# Patient Record
Sex: FEMALE | Race: WHITE | NOT HISPANIC OR LATINO | Employment: UNEMPLOYED | URBAN - METROPOLITAN AREA
[De-identification: names, ages, dates, MRNs, and addresses within clinical notes are randomized per-mention and may not be internally consistent; named-entity substitution may affect disease eponyms.]

---

## 2017-01-06 ENCOUNTER — ROUTINE PRENATAL (OUTPATIENT)
Dept: OBSTETRICS AND GYNECOLOGY | Facility: CLINIC | Age: 32
End: 2017-01-06
Payer: MEDICAID

## 2017-01-06 ENCOUNTER — PROCEDURE VISIT (OUTPATIENT)
Dept: OBSTETRICS AND GYNECOLOGY | Facility: CLINIC | Age: 32
End: 2017-01-06
Payer: MEDICAID

## 2017-01-06 VITALS
BODY MASS INDEX: 38.31 KG/M2 | SYSTOLIC BLOOD PRESSURE: 124 MMHG | WEIGHT: 209.44 LBS | DIASTOLIC BLOOD PRESSURE: 80 MMHG

## 2017-01-06 DIAGNOSIS — O99.213 OBESITY COMPLICATING PREGNANCY, THIRD TRIMESTER: ICD-10-CM

## 2017-01-06 DIAGNOSIS — O34.219 PREVIOUS CESAREAN SECTION COMPLICATING PREGNANCY: Primary | ICD-10-CM

## 2017-01-06 DIAGNOSIS — Z3A.32 32 WEEKS GESTATION OF PREGNANCY: ICD-10-CM

## 2017-01-06 PROCEDURE — 99213 OFFICE O/P EST LOW 20 MIN: CPT | Mod: TH,S$PBB,, | Performed by: OBSTETRICS & GYNECOLOGY

## 2017-01-06 PROCEDURE — 76819 FETAL BIOPHYS PROFIL W/O NST: CPT | Mod: PBBFAC | Performed by: OBSTETRICS & GYNECOLOGY

## 2017-01-06 PROCEDURE — 90471 IMMUNIZATION ADMIN: CPT | Mod: PBBFAC | Performed by: OBSTETRICS & GYNECOLOGY

## 2017-01-06 PROCEDURE — 90715 TDAP VACCINE 7 YRS/> IM: CPT | Mod: PBBFAC | Performed by: OBSTETRICS & GYNECOLOGY

## 2017-01-06 PROCEDURE — 76819 FETAL BIOPHYS PROFIL W/O NST: CPT | Mod: 26,S$PBB,, | Performed by: OBSTETRICS & GYNECOLOGY

## 2017-01-06 PROCEDURE — 99212 OFFICE O/P EST SF 10 MIN: CPT | Mod: PBBFAC,25 | Performed by: OBSTETRICS & GYNECOLOGY

## 2017-01-06 PROCEDURE — 99999 PR PBB SHADOW E&M-EST. PATIENT-LVL II: CPT | Mod: PBBFAC,,, | Performed by: OBSTETRICS & GYNECOLOGY

## 2017-01-06 NOTE — MR AVS SNAPSHOT
[facility]  [address] [facility] [facility]  [place] [address]  Phone: [phone]  Fax: [phone]                  Mago Mcallister   2017 2:45 PM   Routine Prenatal    Description:  Female : 1985   Provider:  Mago Alaniz CNM   Department:  [facility]           Reason for Visit     Routine Prenatal Visit           Diagnoses this Visit        Comments    Previous  section complicating pregnancy    -  Primary            To Do List           Future Appointments        Provider Department Dept Phone    2017 2:30 PM LABORATORY, [address] [facility] [phone]    2017 2:30 PM Shelli Black MD [facility] OB/ -[phone]      Goals (5 Years of Data)     None      [facility]     [facility] On Call Nurse Care Line -  Assistance  Registered nurses in the [facility] provide clinical advisement, health education, appointment booking, and other advisory services.  Call for this free service at [phone].             Medications           Message regarding Medications     Verify the changes and/or additions to your medication regime listed below are the same as discussed with your clinician today.  If any of these changes or additions are incorrect, please notify your healthcare provider.             Verify that the below list of medications is an accurate representation of the medications you are currently taking.  If none reported, the list may be blank. If incorrect, please contact your healthcare provider. Carry this list with you in case of emergency.           Current Medications     aspirin (ECOTRIN) 81 MG EC tablet Take 1 tablet (81 mg total) by mouth once daily.    ondansetron (ZOFRAN, AS HYDROCHLORIDE,) 4 MG tablet Take 1 tablet (4 mg total) by mouth daily as needed for Nausea.    prenatal multivit-Ca-min-Fe-FA (PRENATAL VITAMIN) Tab Take 1 tablet by mouth once daily.    promethazine (PHENERGAN) 25 MG  tablet Take 1 tablet (25 mg total) by mouth every 6 (six) hours as needed for Nausea.           Clinical Reference Information           Prenatal Vitals     Enc. Date GA Prenatal Vitals Prenatal Pulse Pain Level Urine Albumin/Glucose Edema Presentation Dilation/Effacement/Station    1/6/17 32w3d 124/80 / 95 kg (209 lb 7 oz)  / us 144 / Present          10/20/16 21w2d 118/72 / 95 kg (209 lb 7 oz)  / us 144 / Present  0  None / None / None / No      9/29/16 18w2d 116/64 / 97.6 kg (215 lb 2.7 oz)           9/23/16 17w3d 122/60 / 97 kg (213 lb 13.5 oz)  / us / Absent   Negative / Negative None / None / None / No         TWG: -7.059 kg (-15 lb 9 oz)   Pregravid weight: 102.1 kg (225 lb)   Number of fetuses: 1       Vital Signs - Last Recorded  Most recent update: 1/6/2017  3:08 PM by Ros Monsalve MA    BP Wt LMP BMI       124/80 95 kg (209 lb 7 oz) 05/24/2016 38.31 kg/m2       Allergies as of 1/6/2017     No Known Allergies      Immunizations Administered on Date of Encounter - 1/6/2017     Name Date Dose VIS Date Route    TDAP  Incomplete 0.5 mL 2/24/2015 Intramuscular      Orders Placed During Today's Visit      Normal Orders This Visit    Tdap Vaccine (Adult)     Future Labs/Procedures Expected by Expires    CBC auto differential  1/6/2017 3/7/2018    HIV-1 and HIV-2 antibodies  1/6/2017 3/7/2018    OB Glucose Screen  1/6/2017 3/7/2018    RPR  1/6/2017 3/7/2018      [facility] Sign-Up     Activating your [facility] account is as easy as 1-2-3!     1) Visit [email or web address], select Sign Up Now, enter this activation code and your date of birth, then select Next.  [email or web address]  Expires: 2/20/2017  3:11 PM      2) Create a username and password to use when you visit [facility] in the future and select a security question in case you lose your password and select Next.    3) Enter your e-mail address and click Sign Up!    Additional Information  If you have questions, please e-mail [email or web address] or call  [phone] to talk to our [facility] staff. Remember, [facility] is NOT to be used for urgent needs. For medical emergencies, dial 911.

## 2017-01-06 NOTE — PROGRESS NOTES
Gap in care since 21 weeks. Was unable to get appt (?)  BPP  vtx; growth approprate; still with sub-opt spine and ROT; will repeat US in 4 weeks  Needs T3 labs ASAP  Will schedule with MD next visit to discuss TOLA2C vs repeat  with BTL (patient would like to see Dr. Black) and schedule c/s if necessary  OP reports requested from [facility] (both c/s for severe elevated BPs per patient)  BTL consent signed today  TDAP today  Taking ASA  Overall -15lb still with daily nausea but able to hydrate and keep down food, Zofran helps; rec made  C/o round ligament pain, reassured, rec made  PTL precautions and FKCs  Reports dad has Factor V and would like to be tested for it

## 2017-01-11 ENCOUNTER — TELEPHONE (OUTPATIENT)
Dept: OBSTETRICS AND GYNECOLOGY | Facility: CLINIC | Age: 32
End: 2017-01-11

## 2017-01-11 NOTE — TELEPHONE ENCOUNTER
----- Message from Mago Alaniz CNM sent at 1/6/2017  3:28 PM CST -----  Regarding: OP reports  Check on OP reports for this patient

## 2017-01-17 RX ORDER — ONDANSETRON 4 MG/1
TABLET, FILM COATED ORAL
Qty: 30 TABLET | Refills: 0 | Status: ON HOLD | OUTPATIENT
Start: 2017-01-17 | End: 2017-02-18 | Stop reason: HOSPADM

## 2017-01-18 ENCOUNTER — TELEPHONE (OUTPATIENT)
Dept: OBSTETRICS AND GYNECOLOGY | Facility: CLINIC | Age: 32
End: 2017-01-18

## 2017-01-18 ENCOUNTER — HOSPITAL ENCOUNTER (OUTPATIENT)
Facility: HOSPITAL | Age: 32
Discharge: HOME | End: 2017-01-19
Attending: OBSTETRICS & GYNECOLOGY | Admitting: OBSTETRICS & GYNECOLOGY
Payer: MEDICAID

## 2017-01-18 DIAGNOSIS — R19.7 VOMITING AND DIARRHEA: ICD-10-CM

## 2017-01-18 DIAGNOSIS — R11.10 VOMITING AND DIARRHEA: ICD-10-CM

## 2017-01-18 LAB
ALBUMIN SERPL BCP-MCNC: 2.6 G/DL
ALP SERPL-CCNC: 143 U/L
ALT SERPL W/O P-5'-P-CCNC: 11 U/L
ANION GAP SERPL CALC-SCNC: 10 MMOL/L
AST SERPL-CCNC: 15 U/L
BASOPHILS # BLD AUTO: 0.01 K/UL
BASOPHILS NFR BLD: 0.1 %
BILIRUB SERPL-MCNC: 0.6 MG/DL
BUN SERPL-MCNC: 5 MG/DL
CALCIUM SERPL-MCNC: 9.1 MG/DL
CHLORIDE SERPL-SCNC: 108 MMOL/L
CO2 SERPL-SCNC: 20 MMOL/L
CREAT SERPL-MCNC: 0.6 MG/DL
DIFFERENTIAL METHOD: ABNORMAL
EOSINOPHIL # BLD AUTO: 0.1 K/UL
EOSINOPHIL NFR BLD: 1 %
ERYTHROCYTE [DISTWIDTH] IN BLOOD BY AUTOMATED COUNT: 13.5 %
EST. GFR  (AFRICAN AMERICAN): >60 ML/MIN/1.73 M^2
EST. GFR  (NON AFRICAN AMERICAN): >60 ML/MIN/1.73 M^2
GLUCOSE SERPL-MCNC: 95 MG/DL
HCT VFR BLD AUTO: 37.3 %
HGB BLD-MCNC: 13.1 G/DL
LYMPHOCYTES # BLD AUTO: 2 K/UL
LYMPHOCYTES NFR BLD: 27.6 %
MCH RBC QN AUTO: 32 PG
MCHC RBC AUTO-ENTMCNC: 35.1 %
MCV RBC AUTO: 91 FL
MONOCYTES # BLD AUTO: 0.7 K/UL
MONOCYTES NFR BLD: 9.4 %
NEUTROPHILS # BLD AUTO: 4.5 K/UL
NEUTROPHILS NFR BLD: 61.9 %
PLATELET # BLD AUTO: 161 K/UL
PMV BLD AUTO: 11.2 FL
POTASSIUM SERPL-SCNC: 3.1 MMOL/L
PROT SERPL-MCNC: 6.3 G/DL
RBC # BLD AUTO: 4.09 M/UL
SODIUM SERPL-SCNC: 138 MMOL/L
WBC # BLD AUTO: 7.33 K/UL

## 2017-01-18 PROCEDURE — 96374 THER/PROPH/DIAG INJ IV PUSH: CPT

## 2017-01-18 PROCEDURE — 96366 THER/PROPH/DIAG IV INF ADDON: CPT

## 2017-01-18 PROCEDURE — 99211 OFF/OP EST MAY X REQ PHY/QHP: CPT

## 2017-01-18 PROCEDURE — 59025 FETAL NON-STRESS TEST: CPT

## 2017-01-18 PROCEDURE — 85025 COMPLETE CBC W/AUTO DIFF WBC: CPT

## 2017-01-18 PROCEDURE — 63600175 PHARM REV CODE 636 W HCPCS: Performed by: ADVANCED PRACTICE MIDWIFE

## 2017-01-18 PROCEDURE — 96360 HYDRATION IV INFUSION INIT: CPT

## 2017-01-18 PROCEDURE — 25000003 PHARM REV CODE 250: Performed by: ADVANCED PRACTICE MIDWIFE

## 2017-01-18 PROCEDURE — 96372 THER/PROPH/DIAG INJ SC/IM: CPT | Mod: 59

## 2017-01-18 PROCEDURE — 96376 TX/PRO/DX INJ SAME DRUG ADON: CPT

## 2017-01-18 PROCEDURE — 96365 THER/PROPH/DIAG IV INF INIT: CPT

## 2017-01-18 PROCEDURE — 80053 COMPREHEN METABOLIC PANEL: CPT

## 2017-01-18 RX ORDER — ACETAMINOPHEN 500 MG
500 TABLET ORAL EVERY 6 HOURS PRN
Status: DISCONTINUED | OUTPATIENT
Start: 2017-01-18 | End: 2017-01-19 | Stop reason: HOSPADM

## 2017-01-18 RX ORDER — POTASSIUM CHLORIDE 7.45 MG/ML
10 INJECTION INTRAVENOUS
Status: COMPLETED | OUTPATIENT
Start: 2017-01-18 | End: 2017-01-18

## 2017-01-18 RX ORDER — DIPHENOXYLATE HYDROCHLORIDE AND ATROPINE SULFATE 2.5; .025 MG/1; MG/1
1 TABLET ORAL 4 TIMES DAILY PRN
Status: DISCONTINUED | OUTPATIENT
Start: 2017-01-18 | End: 2017-01-19 | Stop reason: HOSPADM

## 2017-01-18 RX ORDER — PROMETHAZINE HYDROCHLORIDE 25 MG/ML
25 INJECTION, SOLUTION INTRAMUSCULAR; INTRAVENOUS EVERY 4 HOURS PRN
Status: DISCONTINUED | OUTPATIENT
Start: 2017-01-18 | End: 2017-01-19 | Stop reason: HOSPADM

## 2017-01-18 RX ADMIN — DIPHENOXYLATE HYDROCHLORIDE AND ATROPINE SULFATE 1 TABLET: 2.5; .025 TABLET ORAL at 09:01

## 2017-01-18 RX ADMIN — POTASSIUM CHLORIDE 10 MEQ: 10 INJECTION, SOLUTION INTRAVENOUS at 11:01

## 2017-01-18 RX ADMIN — PROMETHAZINE HYDROCHLORIDE 25 MG: 25 INJECTION INTRAMUSCULAR; INTRAVENOUS at 08:01

## 2017-01-18 RX ADMIN — SODIUM CHLORIDE, SODIUM LACTATE, POTASSIUM CHLORIDE, AND CALCIUM CHLORIDE 1000 ML: .6; .31; .03; .02 INJECTION, SOLUTION INTRAVENOUS at 09:01

## 2017-01-18 RX ADMIN — SODIUM CHLORIDE, SODIUM LACTATE, POTASSIUM CHLORIDE, AND CALCIUM CHLORIDE 1000 ML: .6; .31; .03; .02 INJECTION, SOLUTION INTRAVENOUS at 08:01

## 2017-01-18 RX ADMIN — POTASSIUM CHLORIDE 10 MEQ: 10 INJECTION, SOLUTION INTRAVENOUS at 10:01

## 2017-01-18 NOTE — TELEPHONE ENCOUNTER
----- Message from Marina Mendoza sent at 1/18/2017  2:39 PM CST -----  Contact: Pt  Pt is requesting to speak to the nurse. Pt states that she has a stomach virus, and that she has not been able to eat anything for the past four days. Pt wants to be advised on what to do. Pls call pt back at [phone].

## 2017-01-18 NOTE — TELEPHONE ENCOUNTER
Patient stated that she has been having diarrhea and vomiting for 4 days not able to eat.  She states that she is keeping hydrated but wants to know what we recommend.  I informed the patient that she can get imodium otc and try gatorade, sprite etc eat a bland diet.  If she is still not feeling better by tomorrow morning she needs to go to L&D for evaluation.  Patient voiced understanding.  Mago JOHNSON Informed, she recommended that patient go in tonight to L&D for evaluation.  Patient informed.

## 2017-01-18 NOTE — IP AVS SNAPSHOT
[facility]  [id number] [place] [address] [address] [place] [address]           Patient Discharge Instructions     Our goal is to set you up for success. This packet includes information on your condition, medications, and your home care. It will help you to care for yourself so you don't get sicker and need to go back to the hospital.     Please ask your nurse if you have any questions.        There are many details to remember when preparing to leave the hospital. Here is what you will need to do:    1. Take your medicine. If you are prescribed medications, review your Medication List in the following pages. You may have new medications to  at the pharmacy and others that you'll need to stop taking. Review the instructions for how and when to take your medications. Talk with your doctor or nurses if you are unsure of what to do.     2. Go to your follow-up appointments. Specific follow-up information is listed in the following pages. Your may be contacted by a transition nurse or clinical provider about future appointments. Be sure we have all of the phone numbers to reach you, if needed. Please contact your provider's office if you are unable to make an appointment.     3. Watch for warning signs. Your doctor or nurse will give you detailed warning signs to watch for and when to call for assistance. These instructions may also include educational information about your condition. If you experience any of warning signs to your health, call your doctor.               [facility]  Unless otherwise directed by your provider, please contact [facility], our nurse care line that is available for 24/7 assistance.     [phone] (toll-free)    Registered nurses in the [facility] provide clinical advisement, health education, appointment booking, and other advisory services.                    ** Verify the list of medication(s) below is accurate and up to date. Carry this with you  in case of emergency. If your medications have changed, please notify your healthcare provider.             Medication List      START taking these medications        Additional Info                      diphenoxylate-atropine 2.5-0.025 mg 2.5-0.025 mg per tablet   Commonly known as:  LOMOTIL   Quantity:  24 tablet   Refills:  0   Dose:  2 tablet    Last time this was given:  1 tablet on 1/19/2017  7:37 AM   Instructions:  Take 2 tablets by mouth 4 (four) times daily as needed for Diarrhea.     Begin Date    AM    Noon    PM    Bedtime         CONTINUE taking these medications        Additional Info                      aspirin 81 MG EC tablet   Commonly known as:  ECOTRIN   Quantity:  30 tablet   Refills:  11   Dose:  81 mg    Instructions:  Take 1 tablet (81 mg total) by mouth once daily.     Begin Date    AM    Noon    PM    Bedtime       ondansetron 4 MG tablet   Commonly known as:  ZOFRAN   Quantity:  30 tablet   Refills:  0    Instructions:  TAKE 1 TABLET BY MOUTH EVERY DAY AS NEEDED FOR NAUSEA AND VOMITING     Begin Date    AM    Noon    PM    Bedtime       prenatal multivit-Ca-min-Fe-FA Tab   Commonly known as:  PRENATAL VITAMIN   Quantity:  30 each   Refills:  11   Dose:  1 tablet   Comments:  Any PNV is OK    Instructions:  Take 1 tablet by mouth once daily.     Begin Date    AM    Noon    PM    Bedtime       promethazine 25 MG tablet   Commonly known as:  PHENERGAN   Quantity:  30 tablet   Refills:  0   Dose:  25 mg    Instructions:  Take 1 tablet (25 mg total) by mouth every 6 (six) hours as needed for Nausea.     Begin Date    AM    Noon    PM    Bedtime            Where to Get Your Medications      You can get these medications from any pharmacy     Bring a paper prescription for each of these medications     diphenoxylate-atropine 2.5-0.025 mg 2.5-0.025 mg per tablet                  Please bring to all follow up appointments:    1. A copy of your discharge instructions.  2. All medicines you are  currently taking in their original bottles.  3. Identification and insurance card.    Please arrive 15 minutes ahead of scheduled appointment time.    Please call 24 hours in advance if you must reschedule your appointment and/or time.        Your Scheduled Appointments     2017  2:30 PM CST   Routine Prenatal Visit with MD [facility] - [facility] (Michelle)    [address] [facility] [address] [phone]              Follow-up Information     Follow up On 2017.          Discharge Instructions        Discharge Instructions    Self Care Instructions:    Diet:  · Eat from the five basic food groups  · Fruits and proteins are good choices  · Limit fast foods and added salt/sugar  · Moderate carbonated and caffeine drinks    Hydration:  · Drink at least 8 large glasses of water a day    Kick Counts:  · After a meal, rest on your side and note the baby's movements until you have 8-10 movements in a 2 hour counting period.    · If you do not feel your baby move 8-10 times within 2 hours or you sense a change in the type or character of the baby's movement, you should come in to the hospital at once.  · Remember; your baby can sleep for 20-40 minutes at a time.      When to notify your provider:    · Vaginal bleeding like a period;  You may spot if we examined your cervix.  · If your water breaks, come to the birth center.  Note time, color and odor.  · Abdominal tenderness or pain that does not go away  · Contractions every 3 to 5 minutes for 1 to 2 hours.  True contractions move from front to back, are regular; usually get longer, stronger and closer together and do not stop if you change your position or activity.  · Any burning, urgency or frequency in relation to emptying your bladder.  · Any temperature greater than 100.4 degrees, chills, flu-like symptoms        Discharge References/Attachments     BLAND DIET (CHILD) (ENGLISH)    DIARRHEA, UNKNOWN CAUSE  (ENGLISH)    VOMITING AND DIARRHEA, NONSPECIFIC (ADULT) (ENGLISH)        Primary Diagnosis     Your primary diagnosis was:  Vomiting And Diarrhea      Admission Information     Date & Time Provider Department CSN    1/18/2017  7:31 PM Kris Nichols MD [facility] -  [id number]      Care Providers     Provider Role Specialty Primary office phone    Kris Nichols MD Attending Provider Obstetrics [phone]      Your Vitals Were     BP Pulse Temp Resp Weight Last Period    125/65 (BP Location: Right arm, Patient Position: Lying, BP Method: Automatic) 87 98.2 °F (36.8 °C) (Oral) 20 94.8 kg (209 lb) 05/24/2016    BMI                38.23 kg/m2          Recent Lab Values     No lab values to display.      Allergies as of 1/19/2017     No Known Allergies      Advance Directives     An advance directive is a document which, in the event you are no longer able to make decisions for yourself, tells your healthcare team what kind of treatment you do or do not want to receive, or who you would like to make those decisions for you.  If you do not currently have an advance directive, [facility] encourages you to create one.  For more information call:  [phone]), [phone] ([phone]),  or log on to [email or web address].        Smoking Cessation     If you would like to quit smoking:   You may be eligible for free services if you are a [place] resident and started smoking cigarettes before September 1, 1988.  Call the Smoking Cessation Trust ([facility]) toll free at (509) 208-7739 or (252) 618-7778.   Call 1-800-QUIT-NOW if you do not meet the above criteria.            Language Assistance Services     ATTENTION: Language assistance services are available, free of charge. Please call [phone].      ATENCIÓN: Si habla angus, tiene a koch disposición servicios gratuitos de asistencia lingüística. Llame al [phone].     CHÚ Ý: N?u b?n nói Ti?ng Vi?t, có các d?ch v? h? tr? ngôn  ng? mi?n phí dành cho b?n. G?i s? [phone].        [facility] Sign-Up     Activating your [facility] account is as easy as 1-2-3!     1) Visit [email or web address], select Sign Up Now, enter this activation code and your date of birth, then select Next.  [email or web address]  Expires: 2/20/2017  3:11 PM      2) Create a username and password to use when you visit [facility] in the future and select a security question in case you lose your password and select Next.    3) Enter your e-mail address and click Sign Up!    Additional Information  If you have questions, please e-mail [email or web address] or call [phone] to talk to our [facility] staff. Remember, [facility] is NOT to be used for urgent needs. For medical emergencies, dial 911.          [facility] complies with applicable Federal civil rights laws and does not discriminate on the basis of race, color, national origin, age, disability, or sex.

## 2017-01-19 VITALS
TEMPERATURE: 98 F | HEART RATE: 75 BPM | RESPIRATION RATE: 18 BRPM | DIASTOLIC BLOOD PRESSURE: 62 MMHG | BODY MASS INDEX: 38.23 KG/M2 | WEIGHT: 209 LBS | SYSTOLIC BLOOD PRESSURE: 101 MMHG

## 2017-01-19 LAB
ANION GAP SERPL CALC-SCNC: 7 MMOL/L
BUN SERPL-MCNC: 3 MG/DL
CALCIUM SERPL-MCNC: 8.2 MG/DL
CHLORIDE SERPL-SCNC: 111 MMOL/L
CO2 SERPL-SCNC: 21 MMOL/L
CREAT SERPL-MCNC: 0.6 MG/DL
EST. GFR  (AFRICAN AMERICAN): >60 ML/MIN/1.73 M^2
EST. GFR  (NON AFRICAN AMERICAN): >60 ML/MIN/1.73 M^2
GLUCOSE SERPL-MCNC: 85 MG/DL
POTASSIUM SERPL-SCNC: 3.5 MMOL/L
SODIUM SERPL-SCNC: 139 MMOL/L

## 2017-01-19 PROCEDURE — 63600175 PHARM REV CODE 636 W HCPCS: Performed by: ADVANCED PRACTICE MIDWIFE

## 2017-01-19 PROCEDURE — 96376 TX/PRO/DX INJ SAME DRUG ADON: CPT

## 2017-01-19 PROCEDURE — 25000003 PHARM REV CODE 250: Performed by: ADVANCED PRACTICE MIDWIFE

## 2017-01-19 PROCEDURE — 96366 THER/PROPH/DIAG IV INF ADDON: CPT

## 2017-01-19 PROCEDURE — 96361 HYDRATE IV INFUSION ADD-ON: CPT

## 2017-01-19 PROCEDURE — 99213 OFFICE O/P EST LOW 20 MIN: CPT | Mod: TH,,, | Performed by: ADVANCED PRACTICE MIDWIFE

## 2017-01-19 PROCEDURE — 96365 THER/PROPH/DIAG IV INF INIT: CPT

## 2017-01-19 PROCEDURE — 80048 BASIC METABOLIC PNL TOTAL CA: CPT

## 2017-01-19 RX ORDER — POTASSIUM CHLORIDE 7.45 MG/ML
10 INJECTION INTRAVENOUS
Status: COMPLETED | OUTPATIENT
Start: 2017-01-19 | End: 2017-01-19

## 2017-01-19 RX ORDER — ONDANSETRON 8 MG/1
8 TABLET, ORALLY DISINTEGRATING ORAL ONCE
Status: COMPLETED | OUTPATIENT
Start: 2017-01-19 | End: 2017-01-19

## 2017-01-19 RX ORDER — DIPHENOXYLATE HYDROCHLORIDE AND ATROPINE SULFATE 2.5; .025 MG/1; MG/1
2 TABLET ORAL 4 TIMES DAILY PRN
Qty: 24 TABLET | Refills: 0 | Status: SHIPPED | OUTPATIENT
Start: 2017-01-19 | End: 2017-01-29

## 2017-01-19 RX ADMIN — DEXTROSE, SODIUM CHLORIDE, SODIUM LACTATE, POTASSIUM CHLORIDE, AND CALCIUM CHLORIDE 1000 ML: 5; .6; .31; .03; .02 INJECTION, SOLUTION INTRAVENOUS at 01:01

## 2017-01-19 RX ADMIN — DIPHENOXYLATE HYDROCHLORIDE AND ATROPINE SULFATE 1 TABLET: 2.5; .025 TABLET ORAL at 07:01

## 2017-01-19 RX ADMIN — DIPHENOXYLATE HYDROCHLORIDE AND ATROPINE SULFATE 1 TABLET: 2.5; .025 TABLET ORAL at 04:01

## 2017-01-19 RX ADMIN — POTASSIUM CHLORIDE 10 MEQ: 10 INJECTION, SOLUTION INTRAVENOUS at 05:01

## 2017-01-19 RX ADMIN — ONDANSETRON 8 MG: 8 TABLET, ORALLY DISINTEGRATING ORAL at 04:01

## 2017-01-19 RX ADMIN — POTASSIUM CHLORIDE 10 MEQ: 10 INJECTION, SOLUTION INTRAVENOUS at 04:01

## 2017-01-19 NOTE — PROGRESS NOTES
Dr. OSMANY Daniel at bedside with patient assessing her and discussing POC; saltine crackers and water given to patient; encouraged her to take small sips of water and crackers slowly to see if she can tolerate PO

## 2017-01-19 NOTE — PROGRESS NOTES
S: Patient's nausea is a little bit better.  No diarrhea in the last hour after receiving immodium.  Tolerated some water.  Is ready to try some crackers.  Denies any abdominal pain.  No fevers.  States her nephew did vomit on her last week.  O: VSS, AF  GEN: resting comfortably  ABD: soft, non-tender  EXT: no c/c/e    BMP  Lab Results   Component Value Date     2017    K 3.5 2017     (H) 2017    CO2 21 (L) 2017    BUN 3 (L) 2017    CREATININE 0.6 2017    CALCIUM 8.2 (L) 2017    ANIONGAP 7 (L) 2017    ESTGFRAFRICA >60 2017    EGFRNONAA >60 2017     A/P: 32 y/o  at 34w2d with acute gastroenteritis  1. Patient is afebrile, pain free, with no leukocytosis.  No concern for listeria or C. Diff.  Reassured her that this will likely resolve.  Encouraged good hand hygiene at home.  2. Okay for discharge to home once tolerating clear liquids and crackers.  Recommend discharge to home with phenergan and zofran as needed for nausea.  Pt can take OTC immodium.

## 2017-01-19 NOTE — H&P
[facility] -   History & Physical  Obstetrics      SUBJECTIVE:     Chief Complaint/Reason for Admission: vomiting and diarrhea    History of Present Illness:  Mago Mcallister is a 31 y.o.  female with an Estimated Date of Delivery: 17 admitted for vomiting and diarrhea for past 3 days.  Her current obstetrical history is significant for prior c section, hx gestational hypertension and obesity.  She reports vomiting. Fetal Movement: normal.    PTA Medications   Medication Sig    aspirin (ECOTRIN) 81 MG EC tablet Take 1 tablet (81 mg total) by mouth once daily.    ondansetron (ZOFRAN) 4 MG tablet TAKE 1 TABLET BY MOUTH EVERY DAY AS NEEDED FOR NAUSEA AND VOMITING    prenatal multivit-Ca-min-Fe-FA (PRENATAL VITAMIN) Tab Take 1 tablet by mouth once daily.    promethazine (PHENERGAN) 25 MG tablet Take 1 tablet (25 mg total) by mouth every 6 (six) hours as needed for Nausea.       Review of patient's allergies indicates:  No Known Allergies     Past Medical History   Diagnosis Date    ADHD (attention deficit hyperactivity disorder)     Ectopic pregnancy, tubal      Left x 2    Endometriosis     History of ovarian cyst     History of pelvic hematoma      x 2     Past Surgical History   Procedure Laterality Date     section      Tonsillectomy      Tympanostomy tube placement      Salpingectomy Left     Ectopic pregnancy surgery Left      x 2     laparoscopic surgery       x 2     Family History   Problem Relation Age of Onset    Graves' disease Father     Hypertension Father     Diabetes Other     Breast cancer Neg Hx     Colon cancer Neg Hx     Ovarian cancer Neg Hx      Social History   Substance Use Topics    Smoking status: Former Smoker     Types: Cigarettes     Quit date: 2015    Smokeless tobacco: Never Used    Alcohol use No       Review of Systems  Constitutional: no fever or chills  Eyes: no visual changes  ENT: no nasal congestion or sore  throat  Respiratory: no cough or shortness of breath  Cardiovascular: no chest pain or palpitations  Gastrointestinal: no nausea or vomiting, tolerating diet  Genitourinary: no hematuria or dysuria  Integument/Breast: no rash or pruritis  Hematologic/Lymphatic: no easy bruising or lymphadenopathy  Musculoskeletal: no arthralgias or myalgias  Neurological: no seizures or tremors  Behavioral/Psych: no auditory or visual hallucinations  Endocrine: no heat or cold intolerance     OBJECTIVE:     Vital Signs (Most Recent):  Temp: 98.3 °F (36.8 °C) (17)  Pulse: 85 (17)  Resp: 18 (17)  BP: 115/64 (17)    Physical Exam:  General:  alert, normal appearing gravid female, cooperative and mild distress   Skin:  Skin color, texture, turgor normal. No rashes or lesions   HEENT:  conjunctivae/corneas clear. PERRL.   Lungs:  clear to auscultation bilaterally   Heart:  regular rate and rhythm, S1, S2 normal, no murmur, click, rub or gallop   Breasts:  no discharge, erythema, or tenderness   Abdomen:  abnormal findings:  hyperactive bowel sounds   Uterine Size:  size equals dates   Presentations:  cephalic   FHT:  160 BPM   Pelvis: Exam deferred.   Cervix:     Dilation:     Effacement:     Station:      Consistency:     Position:      Laboratory:  Lab Results   Component Value Date    GROUPTR O POS 2016    HEPBSAG Negative 2016        Diagnostic Results:  Labs: Reviewed K3.1    ASSESSMENT/PLAN:     34w2d gestation.  Not in labor.   Conditions: N/A     Risks, benefits, alternatives and possible complications have been discussed in detail with the patient.  Pre-admission, admission, and post admission procedures and expectations were discussed in detail.  All questions answered, all appropriate consents will be signed at the Hospital. Admission is planned for today.   Anticipate vaginal delivery.

## 2017-01-19 NOTE — PROGRESS NOTES
"Pt sitting upright in bed; verbalized she was just getting back in bed from restroom; scant diarrhea noted; pt verbalized she feels fine just c/o upper abdominal tightness "I think it's just gas" rating 3/10  "

## 2017-01-19 NOTE — PROGRESS NOTES
Pt asleep upon rounding; was able to eat one cracker and drink some water with no difficulties; denies nausea or abdominal cramping currently; encouraged patient to attempt to eat a couple more bites of cracker and sip more water

## 2017-01-19 NOTE — PROGRESS NOTES
EDITH Prado CNM updated on patient's status; ok to saline lock after current bag of fluids is complete.

## 2017-01-24 ENCOUNTER — ROUTINE PRENATAL (OUTPATIENT)
Dept: OBSTETRICS AND GYNECOLOGY | Facility: CLINIC | Age: 32
End: 2017-01-24
Payer: MEDICAID

## 2017-01-24 VITALS
BODY MASS INDEX: 39.72 KG/M2 | WEIGHT: 217.13 LBS | DIASTOLIC BLOOD PRESSURE: 86 MMHG | SYSTOLIC BLOOD PRESSURE: 136 MMHG

## 2017-01-24 DIAGNOSIS — O09.33 INSUFFICIENT PRENATAL CARE, THIRD TRIMESTER: ICD-10-CM

## 2017-01-24 DIAGNOSIS — O99.333 TOBACCO USE IN PREGNANCY, THIRD TRIMESTER: ICD-10-CM

## 2017-01-24 DIAGNOSIS — O99.213 OBESITY COMPLICATING PREGNANCY, CHILDBIRTH, OR PUERPERIUM, ANTEPARTUM, THIRD TRIMESTER: ICD-10-CM

## 2017-01-24 DIAGNOSIS — Z34.90 NORMAL PREGNANCY, UNSPECIFIED TRIMESTER: Primary | ICD-10-CM

## 2017-01-24 DIAGNOSIS — Z98.891 H/O: C-SECTION: ICD-10-CM

## 2017-01-24 PROCEDURE — 87081 CULTURE SCREEN ONLY: CPT

## 2017-01-24 PROCEDURE — 99212 OFFICE O/P EST SF 10 MIN: CPT | Mod: TH,S$PBB,, | Performed by: OBSTETRICS & GYNECOLOGY

## 2017-01-24 PROCEDURE — 99999 PR PBB SHADOW E&M-EST. PATIENT-LVL II: CPT | Mod: PBBFAC,,, | Performed by: OBSTETRICS & GYNECOLOGY

## 2017-01-24 PROCEDURE — 99212 OFFICE O/P EST SF 10 MIN: CPT | Mod: PBBFAC | Performed by: OBSTETRICS & GYNECOLOGY

## 2017-01-24 NOTE — MR AVS SNAPSHOT
[facility]  [address] [facility]  [place] [address]  Phone: [phone]  Fax: [phone]                  Mago Mcallister   2017 2:30 PM   Routine Prenatal    Description:  Female : 1985   Provider:  Shelli Black MD   Department:  [facility]                To Do List           Future Appointments        Provider Department Dept Phone    2017 2:45 PM Mago Alaniz CNM [facility]/ -[phone]      Goals (5 Years of Data)     None      [facility]     [facility] Nurse [facility] -  Assistance  Registered nurses in the [facility] On Call Center provide clinical advisement, health education, appointment booking, and other advisory services.  Call for this free service at [phone].             Medications           Message regarding Medications     Verify the changes and/or additions to your medication regime listed below are the same as discussed with your clinician today.  If any of these changes or additions are incorrect, please notify your healthcare provider.             Verify that the below list of medications is an accurate representation of the medications you are currently taking.  If none reported, the list may be blank. If incorrect, please contact your healthcare provider. Carry this list with you in case of emergency.           Current Medications     aspirin (ECOTRIN) 81 MG EC tablet Take 1 tablet (81 mg total) by mouth once daily.    diphenoxylate-atropine 2.5-0.025 mg (LOMOTIL) 2.5-0.025 mg per tablet Take 2 tablets by mouth 4 (four) times daily as needed for Diarrhea.    ondansetron (ZOFRAN) 4 MG tablet TAKE 1 TABLET BY MOUTH EVERY DAY AS NEEDED FOR NAUSEA AND VOMITING    prenatal multivit-Ca-min-Fe-FA (PRENATAL VITAMIN) Tab Take 1 tablet by mouth once daily.    promethazine (PHENERGAN) 25 MG tablet Take 1 tablet (25 mg total) by mouth every 6 (six) hours as needed for Nausea.           Clinical Reference Information            Prenatal Vitals     Enc. Date GA Prenatal Vitals Prenatal Pulse Pain Level Urine Albumin/Glucose Edema Presentation Dilation/Effacement/Station    1/24/17 35w0d 136/86 / 98.5 kg (217 lb 2.5 oz)  / 146 / Present   Negative / Negative       1/18/17 34w1d Admission Dept: [facility] L&D    1/6/17 32w3d 124/80 / 95 kg (209 lb 7 oz)  / us 144 / Present   Negative / Negative None / None / None / No Vertex     10/20/16 21w2d 118/72 / 95 kg (209 lb 7 oz)  / us 144 / Present  0  None / None / None / No      9/29/16 18w2d 116/64 / 97.6 kg (215 lb 2.7 oz)           9/23/16 17w3d 122/60 / 97 kg (213 lb 13.5 oz)  / us / Absent   Negative / Negative None / None / None / No         TWG: -3.559 kg (-7 lb 13.6 oz)   Pregravid weight: 102.1 kg (225 lb)   Number of fetuses: 1       Vital Signs - Last Recorded  Most recent update: 1/24/2017  2:57 PM by Gali Dietrich LPN    BP Wt LMP BMI       136/86 98.5 kg (217 lb 2.5 oz) 05/24/2016 39.72 kg/m2       Allergies as of 1/24/2017     No Known Allergies      Immunizations Administered on Date of Encounter - 1/24/2017     None      [facility] Sign-Up     Activating your [facility] account is as easy as 1-2-3!     1) Visit [email or web address], select Sign Up Now, enter this activation code and your date of birth, then select Next.  [email or web address]  Expires: 2/20/2017  3:11 PM      2) Create a username and password to use when you visit [facility] in the future and select a security question in case you lose your password and select Next.    3) Enter your e-mail address and click Sign Up!    Additional Information  If you have questions, please e-mail [email or web address] or call [phone] to talk to our [facility] staff. Remember, [facility] is NOT to be used for urgent needs. For medical emergencies, dial 911.

## 2017-01-24 NOTE — PROGRESS NOTES
Expresses desire for MACI. First pregnancy complicated by elevated BPs with csection, subsequent cs without offering MACI (at [facility]). Cervix closed/thick today. Ok for  MACI IF SPONTANEOUS ACTIVE LABOR. If no labor, will proceed with repeat cs at 40w. Aware that may have to do interval BTL if .  & cs wit btl consents done today  GBS done

## 2017-01-28 LAB — BACTERIA SPEC AEROBE CULT: NORMAL

## 2017-02-01 ENCOUNTER — LAB VISIT (OUTPATIENT)
Dept: LAB | Facility: HOSPITAL | Age: 32
End: 2017-02-01
Attending: OBSTETRICS & GYNECOLOGY
Payer: MEDICAID

## 2017-02-01 ENCOUNTER — PROCEDURE VISIT (OUTPATIENT)
Dept: OBSTETRICS AND GYNECOLOGY | Facility: CLINIC | Age: 32
End: 2017-02-01
Payer: MEDICAID

## 2017-02-01 ENCOUNTER — ROUTINE PRENATAL (OUTPATIENT)
Dept: OBSTETRICS AND GYNECOLOGY | Facility: CLINIC | Age: 32
End: 2017-02-01
Payer: MEDICAID

## 2017-02-01 VITALS
DIASTOLIC BLOOD PRESSURE: 70 MMHG | BODY MASS INDEX: 39.52 KG/M2 | WEIGHT: 216.06 LBS | SYSTOLIC BLOOD PRESSURE: 122 MMHG

## 2017-02-01 DIAGNOSIS — Z87.59 HISTORY OF GESTATIONAL HYPERTENSION: Primary | ICD-10-CM

## 2017-02-01 DIAGNOSIS — Z87.59 HISTORY OF GESTATIONAL HYPERTENSION: ICD-10-CM

## 2017-02-01 DIAGNOSIS — O34.219 PREVIOUS CESAREAN SECTION COMPLICATING PREGNANCY: ICD-10-CM

## 2017-02-01 LAB
ALBUMIN SERPL BCP-MCNC: 2.4 G/DL
ALP SERPL-CCNC: 167 U/L
ALT SERPL W/O P-5'-P-CCNC: 8 U/L
ANION GAP SERPL CALC-SCNC: 6 MMOL/L
AST SERPL-CCNC: 18 U/L
BASOPHILS # BLD AUTO: 0.01 K/UL
BASOPHILS # BLD AUTO: 0.01 K/UL
BASOPHILS NFR BLD: 0.1 %
BASOPHILS NFR BLD: 0.1 %
BILIRUB SERPL-MCNC: 0.4 MG/DL
BUN SERPL-MCNC: 5 MG/DL
CALCIUM SERPL-MCNC: 8.4 MG/DL
CHLORIDE SERPL-SCNC: 105 MMOL/L
CO2 SERPL-SCNC: 23 MMOL/L
CREAT SERPL-MCNC: 0.7 MG/DL
DIFFERENTIAL METHOD: ABNORMAL
DIFFERENTIAL METHOD: ABNORMAL
EOSINOPHIL # BLD AUTO: 0 K/UL
EOSINOPHIL # BLD AUTO: 0 K/UL
EOSINOPHIL NFR BLD: 0.5 %
EOSINOPHIL NFR BLD: 0.5 %
ERYTHROCYTE [DISTWIDTH] IN BLOOD BY AUTOMATED COUNT: 13.4 %
ERYTHROCYTE [DISTWIDTH] IN BLOOD BY AUTOMATED COUNT: 13.4 %
EST. GFR  (AFRICAN AMERICAN): >60 ML/MIN/1.73 M^2
EST. GFR  (NON AFRICAN AMERICAN): >60 ML/MIN/1.73 M^2
GLUCOSE SERPL-MCNC: 119 MG/DL
GLUCOSE SERPL-MCNC: 121 MG/DL
HCT VFR BLD AUTO: 36.8 %
HCT VFR BLD AUTO: 36.8 %
HGB BLD-MCNC: 12.1 G/DL
HGB BLD-MCNC: 12.1 G/DL
LYMPHOCYTES # BLD AUTO: 1.7 K/UL
LYMPHOCYTES # BLD AUTO: 1.7 K/UL
LYMPHOCYTES NFR BLD: 20.6 %
LYMPHOCYTES NFR BLD: 20.6 %
MCH RBC QN AUTO: 30.7 PG
MCH RBC QN AUTO: 30.7 PG
MCHC RBC AUTO-ENTMCNC: 32.9 %
MCHC RBC AUTO-ENTMCNC: 32.9 %
MCV RBC AUTO: 93 FL
MCV RBC AUTO: 93 FL
MONOCYTES # BLD AUTO: 0.5 K/UL
MONOCYTES # BLD AUTO: 0.5 K/UL
MONOCYTES NFR BLD: 6.2 %
MONOCYTES NFR BLD: 6.2 %
NEUTROPHILS # BLD AUTO: 6.1 K/UL
NEUTROPHILS # BLD AUTO: 6.1 K/UL
NEUTROPHILS NFR BLD: 72.4 %
NEUTROPHILS NFR BLD: 72.4 %
PLATELET # BLD AUTO: 177 K/UL
PLATELET # BLD AUTO: 177 K/UL
PMV BLD AUTO: 12 FL
PMV BLD AUTO: 12 FL
POTASSIUM SERPL-SCNC: 3.4 MMOL/L
PROT SERPL-MCNC: 6 G/DL
RBC # BLD AUTO: 3.94 M/UL
RBC # BLD AUTO: 3.94 M/UL
SODIUM SERPL-SCNC: 134 MMOL/L
WBC # BLD AUTO: 8.41 K/UL
WBC # BLD AUTO: 8.41 K/UL

## 2017-02-01 PROCEDURE — 76819 FETAL BIOPHYS PROFIL W/O NST: CPT | Mod: 59,PBBFAC | Performed by: OBSTETRICS & GYNECOLOGY

## 2017-02-01 PROCEDURE — 99999 PR PBB SHADOW E&M-EST. PATIENT-LVL II: CPT | Mod: PBBFAC,,, | Performed by: OBSTETRICS & GYNECOLOGY

## 2017-02-01 PROCEDURE — 81241 F5 GENE: CPT

## 2017-02-01 PROCEDURE — 76819 FETAL BIOPHYS PROFIL W/O NST: CPT | Mod: 26,S$PBB,, | Performed by: OBSTETRICS & GYNECOLOGY

## 2017-02-01 PROCEDURE — 82570 ASSAY OF URINE CREATININE: CPT

## 2017-02-01 PROCEDURE — 76815 OB US LIMITED FETUS(S): CPT | Mod: 26,S$PBB,, | Performed by: OBSTETRICS & GYNECOLOGY

## 2017-02-01 PROCEDURE — 99213 OFFICE O/P EST LOW 20 MIN: CPT | Mod: TH,S$PBB,, | Performed by: OBSTETRICS & GYNECOLOGY

## 2017-02-01 PROCEDURE — 76815 OB US LIMITED FETUS(S): CPT | Mod: 59,PBBFAC | Performed by: OBSTETRICS & GYNECOLOGY

## 2017-02-01 NOTE — PROGRESS NOTES
"Growth ultrasound after visit today with BPP for dec fetal movement  Patient with sudden increase in swelling and "does not feel right" - will do pre-e labs out patient today, as BP WNL   Message sent to Tg to schedule pt for repeat  with BTL if no labor by 39 weeks per patient request  GBS negative  PTL precautions and [facility]    Coffective counseling sheet Protect Breastfeeding discussed with mother. Reinforced avoidence of artifical nipples and formula, unless medically indicated.  Encouraged mother to download Coffective mobile deejay if she has not already done so. Mother verbalizes understanding.      "

## 2017-02-01 NOTE — MR AVS SNAPSHOT
[facility]  [address] [facility]  [place] [address]  Phone: [phone]  Fax: [phone]                  Mago Mcallister   2017 2:45 PM   Routine Prenatal    Description:  Female : 1985   Provider:  Mago Alaniz CNM   Department:  [facility]           Reason for Visit     Routine Prenatal Visit                To Do List           Future Appointments        Provider Department Dept Phone    2017 3:15 PM Mago Alaniz CNM [facility]/ -[phone]      Goals (5 Years of Data)     None      [facility]     [facility] On Call Nurse [facility] -  Assistance  Registered nurses in the [facility] On Call Center provide clinical advisement, health education, appointment booking, and other advisory services.  Call for this free service at [phone].             Medications           Message regarding Medications     Verify the changes and/or additions to your medication regime listed below are the same as discussed with your clinician today.  If any of these changes or additions are incorrect, please notify your healthcare provider.             Verify that the below list of medications is an accurate representation of the medications you are currently taking.  If none reported, the list may be blank. If incorrect, please contact your healthcare provider. Carry this list with you in case of emergency.           Current Medications     aspirin (ECOTRIN) 81 MG EC tablet Take 1 tablet (81 mg total) by mouth once daily.    ondansetron (ZOFRAN) 4 MG tablet TAKE 1 TABLET BY MOUTH EVERY DAY AS NEEDED FOR NAUSEA AND VOMITING    prenatal multivit-Ca-min-Fe-FA (PRENATAL VITAMIN) Tab Take 1 tablet by mouth once daily.    promethazine (PHENERGAN) 25 MG tablet Take 1 tablet (25 mg total) by mouth every 6 (six) hours as needed for Nausea.           Clinical Reference Information           Prenatal Vitals     Enc. Date GA Prenatal Vitals Prenatal Pulse Pain Level  Urine Albumin/Glucose Edema Presentation Dilation/Effacement/Station    2/1/17 36w1d 122/70 / 98 kg (216 lb 0.8 oz)  / 130 / Present   Negative / Negative       1/24/17 35w0d 136/86 / 98.5 kg (217 lb 2.5 oz)  / 146 / Present   Negative / Negative       1/18/17 34w1d Admission Dept: [facility] L[facility]    1/6/17 32w3d 124/80 / 95 kg (209 lb 7 oz)  / us 144 / Present   Negative / Negative None / None / None / No Vertex     10/20/16 21w2d 118/72 / 95 kg (209 lb 7 oz)  / us 144 / Present  0  None / None / None / No      9/29/16 18w2d 116/64 / 97.6 kg (215 lb 2.7 oz)           9/23/16 17w3d 122/60 / 97 kg (213 lb 13.5 oz)  / us / Absent   Negative / Negative None / None / None / No         TWG: -4.059 kg (-8 lb 15.2 oz)   Pregravid weight: 102.1 kg (225 lb)   Number of fetuses: 1       Vital Signs - Last Recorded  Most recent update: 2/1/2017  3:07 PM by Emma Hanna MA    BP Wt LMP BMI       122/70 98 kg (216 lb 0.8 oz) 05/24/2016 39.52 kg/m2       Allergies as of 2/1/2017     No Known Allergies      Immunizations Administered on Date of Encounter - 2/1/2017     None      [facility] Sign-Up     Activating your [facility] account is as easy as 1-2-3!     1) Visit [email or web address], select Sign Up Now, enter this activation code and your date of birth, then select Next.  [email or web address]  Expires: 2/20/2017  3:11 PM      2) Create a username and password to use when you visit [facility] in the future and select a security question in case you lose your password and select Next.    3) Enter your e-mail address and click Sign Up!    Additional Information  If you have questions, please e-mail [email or web address] or call [phone] to talk to our [facility] staff. Remember, [facility] is NOT to be used for urgent needs. For medical emergencies, dial 911.

## 2017-02-02 ENCOUNTER — TELEPHONE (OUTPATIENT)
Dept: OBSTETRICS AND GYNECOLOGY | Facility: CLINIC | Age: 32
End: 2017-02-02

## 2017-02-02 LAB
CREAT UR-MCNC: 160.1 MG/DL
HIV 1+2 AB+HIV1 P24 AG SERPL QL IA: NEGATIVE
PROT UR-MCNC: 19 MG/DL
PROT/CREAT RATIO, UR: 0.12
RPR SER QL: NORMAL

## 2017-02-02 NOTE — TELEPHONE ENCOUNTER
----- Message from Yessica Camara sent at 2/2/2017  2:35 PM CST -----  Contact: pt  Pt is requesting to speak with nurse regarding not feeling fetal movement in a 24hr period. Pls call pt back at [phone]

## 2017-02-03 ENCOUNTER — HOSPITAL ENCOUNTER (OUTPATIENT)
Facility: HOSPITAL | Age: 32
Discharge: HOME | End: 2017-02-03
Attending: OBSTETRICS & GYNECOLOGY | Admitting: OBSTETRICS & GYNECOLOGY
Payer: MEDICAID

## 2017-02-03 VITALS
RESPIRATION RATE: 18 BRPM | HEIGHT: 63 IN | SYSTOLIC BLOOD PRESSURE: 136 MMHG | HEART RATE: 76 BPM | TEMPERATURE: 99 F | DIASTOLIC BLOOD PRESSURE: 86 MMHG | WEIGHT: 216 LBS | BODY MASS INDEX: 38.27 KG/M2

## 2017-02-03 DIAGNOSIS — O36.8131 DECREASED FETAL MOVEMENT, THIRD TRIMESTER, FETUS 1: ICD-10-CM

## 2017-02-03 DIAGNOSIS — O36.8190 DECREASED FETAL MOVEMENT: ICD-10-CM

## 2017-02-03 PROBLEM — K08.9 POOR DENTITION: Status: ACTIVE | Noted: 2017-02-03

## 2017-02-03 LAB — F5 P.R506Q BLD/T QL: NORMAL

## 2017-02-03 PROCEDURE — 99211 OFF/OP EST MAY X REQ PHY/QHP: CPT | Mod: 25

## 2017-02-03 PROCEDURE — 59025 FETAL NON-STRESS TEST: CPT | Mod: 26,,, | Performed by: OBSTETRICS & GYNECOLOGY

## 2017-02-03 PROCEDURE — 59025 FETAL NON-STRESS TEST: CPT

## 2017-02-03 PROCEDURE — 99213 OFFICE O/P EST LOW 20 MIN: CPT | Mod: 25,TH,, | Performed by: OBSTETRICS & GYNECOLOGY

## 2017-02-03 NOTE — DISCHARGE SUMMARY
"[facility] -   Discharge Summary  Obstetrics - Triage      Admit Date: 2/3/2017    Discharge Date and Time 2/3/2017 8:57 AM:     Attending Physician: Anali Villela, *     Discharge Provider: Mago Alaniz    Reason for Admission: decreased fetal movement    Procedures Performed: * No surgery found *    Hospital Course (synopsis of major diagnoses, care, treatment, and services provided during the course of the hospital stay):     Mago Mcallister is a 31 y.o.  CaucasianF at 36w3d presents complaining of decreased fetal movement since yesterday.     This IUP is complicated by previous  x 2; hx of GHTN with previous pregnancies.  Patient denies contractions, denies vaginal bleeding, denies LOF.   Fetal Movement: now feeling FM.     Vital Signs:   Visit Vitals    /86    Pulse 76    Temp 98.6 °F (37 °C) (Oral)    Resp 18    Ht 5' 3" (1.6 m)    Wt 98 kg (216 lb)    LMP 2016    Breastfeeding No    BMI 38.26 kg/m2     Temp:  [98.6 °F (37 °C)]   Pulse:  [76]   Resp:  [18]   BP: (136)/(86)    Physical Exam:   General:   in no apparent distress, well developed and well nourished, non-toxic, in no respiratory distress and acyanotic, alert, oriented times 3, afebrile and normal vitals   Cardiovascular: regular rate and rhythm, no murmurs   Respiratory: clear to auscultation, no wheezes, rales or rhonchi, symmetric air entry   Abdominal: FHT present   Extremities: no redness or tenderness in the calves or thighs, no edema     SVE: deferred    NST: reactive (see note)  TOCO: None    Labs:  Blood type: O POS    ASSESSMENT: Mago Mcallister is a 31 y.o.   at 36w3d with decreased fetal movement.    Final Diagnoses:    Principal Problem: <principal problem not specified>   Secondary Diagnoses:   Active Hospital Problems    Diagnosis  POA   No active problems to display.      Resolved Hospital Problems    Diagnosis Date Resolved POA    Decreased " fetal movement [O36.8190] 02/03/2017 Yes       PLAN:  1. Discharge home with strong FKCs, pre-e precautions and PTL precautions. F/u as scheduled or PRN  2. Discharge Condition: good  3. Discharge Disposition: Discharge to Home     Pt was given routine pregnancy instructions including to return to triage if she had vaginal bleeding (other than spotting for the next 48 hrs), any loss of fluid, decreased fetal movement, or contractions that occur every 2-5 min lasting for 2 hours or more. Pt was also instructed to drink about 8-10 glasses of water per day. She was also given instructions to return for pre-eclampsia symptoms including: headache not relieved with tylenol, shortness of breath, changes in her vision, or pain in the right upper quadrant of her abdomen. Patient voiced understanding of all these instructions and was subsequently discharged home.    Follow Up/Patient Instructions:     Medications:  Reconciled Home Medications:   Current Discharge Medication List      CONTINUE these medications which have NOT CHANGED    Details   ondansetron (ZOFRAN) 4 MG tablet TAKE 1 TABLET BY MOUTH EVERY DAY AS NEEDED FOR NAUSEA AND VOMITING  Qty: 30 tablet, Refills: 0      prenatal multivit-Ca-min-Fe-FA (PRENATAL VITAMIN) Tab Take 1 tablet by mouth once daily.  Qty: 30 each, Refills: 11    Comments: Any PNV is OK      promethazine (PHENERGAN) 25 MG tablet Take 1 tablet (25 mg total) by mouth every 6 (six) hours as needed for Nausea.  Qty: 30 tablet, Refills: 0         STOP taking these medications       aspirin (ECOTRIN) 81 MG EC tablet Comments:   Reason for Stopping:               Discharge Procedure Orders  Diet general       Follow-up Information     Please follow up.    Why:  As needed, If symptoms worsen

## 2017-02-03 NOTE — PROGRESS NOTES
FETAL SURVEILLANCE TESTING SUMMARY  NST    INDICATIONS:  decreased fetal movement    Fetal doppler heart rate tracing obtained in the usual fashion with the patient in the left supine position.    OBJECTIVE RESULTS:    Fetal heart variability: moderate  Fetal Heart Rate decelerations: none  Fetal Heart Rate accelerations: yes  Fetal Non-stress Test: reactive    Fetal surveillance: reassuring

## 2017-02-03 NOTE — IP AVS SNAPSHOT
[facility]  [id number] [place] [address] [address] [place] [address]           Patient Discharge Instructions     Our goal is to set you up for success. This packet includes information on your condition, medications, and your home care. It will help you to care for yourself so you don't get sicker and need to go back to the hospital.     Please ask your nurse if you have any questions.        There are many details to remember when preparing to leave the hospital. Here is what you will need to do:    1. Take your medicine. If you are prescribed medications, review your Medication List in the following pages. You may have new medications to  at the pharmacy and others that you'll need to stop taking. Review the instructions for how and when to take your medications. Talk with your doctor or nurses if you are unsure of what to do.     2. Go to your follow-up appointments. Specific follow-up information is listed in the following pages. Your may be contacted by a transition nurse or clinical provider about future appointments. Be sure we have all of the phone numbers to reach you, if needed. Please contact your provider's office if you are unable to make an appointment.     3. Watch for warning signs. Your doctor or nurse will give you detailed warning signs to watch for and when to call for assistance. These instructions may also include educational information about your condition. If you experience any of warning signs to your health, call your doctor.               [facility]  Unless otherwise directed by your provider, please contact [facility], our nurse care line that is available for 24/7 assistance.     [phone] (toll-free)    Registered nurses in the [facility] Center provide clinical advisement, health education, appointment booking, and other advisory services.                    ** Verify the list of medication(s) below is accurate and up to date. Carry this with you  in case of emergency. If your medications have changed, please notify your healthcare provider.             Medication List      ASK your doctor about these medications        Additional Info                      aspirin 81 MG EC tablet   Commonly known as:  ECOTRIN   Quantity:  30 tablet   Refills:  11   Dose:  81 mg    Instructions:  Take 1 tablet (81 mg total) by mouth once daily.     Begin Date    AM    Noon    PM    Bedtime       ondansetron 4 MG tablet   Commonly known as:  ZOFRAN   Quantity:  30 tablet   Refills:  0    Instructions:  TAKE 1 TABLET BY MOUTH EVERY DAY AS NEEDED FOR NAUSEA AND VOMITING     Begin Date    AM    Noon    PM    Bedtime       prenatal multivit-Ca-min-Fe-FA Tab   Commonly known as:  PRENATAL VITAMIN   Quantity:  30 each   Refills:  11   Dose:  1 tablet   Comments:  Any PNV is OK    Instructions:  Take 1 tablet by mouth once daily.     Begin Date    AM    Noon    PM    Bedtime       promethazine 25 MG tablet   Commonly known as:  PHENERGAN   Quantity:  30 tablet   Refills:  0   Dose:  25 mg    Instructions:  Take 1 tablet (25 mg total) by mouth every 6 (six) hours as needed for Nausea.     Begin Date    AM    Noon    PM    Bedtime                  Please bring to all follow up appointments:    1. A copy of your discharge instructions.  2. All medicines you are currently taking in their original bottles.  3. Identification and insurance card.    Please arrive 15 minutes ahead of scheduled appointment time.    Please call 24 hours in advance if you must reschedule your appointment and/or time.        Your Scheduled Appointments     2017  3:15 PM CST   Routine Prenatal Visit with COLETTE Ramirez - OB/ GYN (Michelle)    [address] [facility] [address] [phone]                  Discharge Instructions        Discharge Instructions    Self Care Instructions:    Diet:  · Eat from the five basic food groups  · Fruits and proteins are  "good choices  · Limit fast foods and added salt/sugar  · Moderate carbonated and caffeine drinks    Hydration:  · Drink at least 8 large glasses of water a day    Kick Counts:  · After a meal, rest on your side and note the baby's movements until you have 8-10 movements in a 2 hour counting period.    · If you do not feel your baby move 8-10 times within 2 hours or you sense a change in the type or character of the baby's movement, you should come in to the hospital at once.  · Remember; your baby can sleep for 20-40 minutes at a time.      When to notify your provider:    · Vaginal bleeding like a period;  You may spot if we examined your cervix.  · If your water breaks, come to the birth center.  Note time, color and odor.  · Abdominal tenderness or pain that does not go away  · Contractions every 3 to 5 minutes for 1 to 2 hours.  True contractions move from front to back, are regular; usually get longer, stronger and closer together and do not stop if you change your position or activity.  · Any burning, urgency or frequency in relation to emptying your bladder.  · Any temperature greater than 100.4 degrees, chills, flu-like symptoms        Discharge References/Attachments     KICK COUNTS (ENGLISH)        Admission Information     Date & Time Provider Department CSN    2/3/2017  8:21 AM Anali Villela MD [facility] -  [id number]      Care Providers     Provider Role Specialty Primary office phone    Anali Villela MD Attending Provider Obstetrics [phone]      Your Vitals Were     BP Pulse Temp Resp Height Weight    136/86 76 98.6 °F (37 °C) (Oral) 18 5' 3" (1.6 m) 98 kg (216 lb)    Last Period BMI             05/24/2016 38.26 kg/m2         Recent Lab Values     No lab values to display.      Allergies as of 2/3/2017     No Known Allergies      Advance Directives     An advance directive is a document which, in the event you are no longer able to make decisions for yourself, " tells your healthcare team what kind of treatment you do or do not want to receive, or who you would like to make those decisions for you.  If you do not currently have an advance directive, [facility] encourages you to create one.  For more information call:  [phone]), [phone] ([phone]),  or log on to [email or web address].        Smoking Cessation     If you would like to quit smoking:   You may be eligible for free services if you are a [place] resident and started smoking cigarettes before September 1, 1988.  Call the Smoking Cessation Trust ([facility]) toll free at (765) 009-8935 or (168) 425-3911.   Call 6-154-QUIT-NOW if you do not meet the above criteria.            Language Assistance Services     ATTENTION: Language assistance services are available, free of charge. Please call [phone].      ATENCIÓN: Si habla español, tiene a koch disposición servicios gratuitos de asistencia lingüística. Llame al [phone].     CHÚ Ý: N?u b?n nói Ti?ng Vi?t, có các d?ch v? h? tr? ngôn ng? mi?n phí dành cho b?n. G?i s? [phone].        [facility] Sign-Up     Activating your [facility] account is as easy as 1-2-3!     1) Visit [email or web address], select Sign Up Now, enter this activation code and your date of birth, then select Next.  [email or web address]  Expires: 2/20/2017  3:11 PM      2) Create a username and password to use when you visit [facility] in the future and select a security question in case you lose your password and select Next.    3) Enter your e-mail address and click Sign Up!    Additional Information  If you have questions, please e-mail [email or web address] or call [phone] to talk to our [facility] staff. Remember, [facility] is NOT to be used for urgent needs. For medical emergencies, dial 911.          [facility] complies with applicable Federal civil rights laws and does not discriminate on the basis of race, color, national origin, age,  disability, or sex.

## 2017-02-08 ENCOUNTER — ROUTINE PRENATAL (OUTPATIENT)
Dept: OBSTETRICS AND GYNECOLOGY | Facility: CLINIC | Age: 32
End: 2017-02-08
Payer: MEDICAID

## 2017-02-08 ENCOUNTER — PROCEDURE VISIT (OUTPATIENT)
Dept: OBSTETRICS AND GYNECOLOGY | Facility: CLINIC | Age: 32
End: 2017-02-08
Payer: MEDICAID

## 2017-02-08 VITALS
DIASTOLIC BLOOD PRESSURE: 60 MMHG | SYSTOLIC BLOOD PRESSURE: 128 MMHG | BODY MASS INDEX: 38.66 KG/M2 | WEIGHT: 218.25 LBS

## 2017-02-08 DIAGNOSIS — O36.8390 UNABLE TO HEAR FETAL HEART TONES AS REASON FOR ULTRASOUND SCAN: ICD-10-CM

## 2017-02-08 DIAGNOSIS — O34.219 PREVIOUS CESAREAN SECTION COMPLICATING PREGNANCY: Primary | ICD-10-CM

## 2017-02-08 PROCEDURE — 76819 FETAL BIOPHYS PROFIL W/O NST: CPT | Mod: 26,S$PBB,, | Performed by: OBSTETRICS & GYNECOLOGY

## 2017-02-08 PROCEDURE — 99999 PR PBB SHADOW E&M-EST. PATIENT-LVL II: CPT | Mod: PBBFAC,,, | Performed by: OBSTETRICS & GYNECOLOGY

## 2017-02-08 PROCEDURE — 76819 FETAL BIOPHYS PROFIL W/O NST: CPT | Mod: PBBFAC | Performed by: OBSTETRICS & GYNECOLOGY

## 2017-02-08 PROCEDURE — 99213 OFFICE O/P EST LOW 20 MIN: CPT | Mod: TH,S$PBB,, | Performed by: OBSTETRICS & GYNECOLOGY

## 2017-02-08 NOTE — PROGRESS NOTES
scheduled for 17 @ 1000 with Dr. Black - will do pre-OP instructions next visit  Unable to hear FHTs with doppler, ultrasound done: BPP 8/8  MVP 3.3 PAU 7.3 vtx  Labor precautions, FKCs, Pre-e precautions

## 2017-02-08 NOTE — MR AVS SNAPSHOT
[facility]  [address] [facility] [address]  Phone: [phone]  Fax: [phone]                  Mago Mcallister   2017 3:15 PM   Routine Prenatal    Description:  Female : 1985   Provider:  Mago Alaniz CNM   Department:  [facility]           Reason for Visit     Routine Prenatal Visit           Diagnoses this Visit        Comments    Unable to hear fetal heart tones as reason for ultrasound scan    -  Primary            To Do List           Future Appointments        Provider Department Dept Phone    2/15/2017 3:15 PM Mago Alaniz CNM [facility]/ [phone]      Your Future Surgeries/Procedures     2017   Surgery with Shelli Black MD   [facility])    [address] [facility] [address] [phone]              Goals (5 Years of Data)     None      [facility]     [facility] Nurse Care Line - 24/7 Assistance  Registered nurses in the [facility] provide clinical advisement, health education, appointment booking, and other advisory services.  Call for this free service at [phone].             Medications           Message regarding Medications     Verify the changes and/or additions to your medication regime listed below are the same as discussed with your clinician today.  If any of these changes or additions are incorrect, please notify your healthcare provider.             Verify that the below list of medications is an accurate representation of the medications you are currently taking.  If none reported, the list may be blank. If incorrect, please contact your healthcare provider. Carry this list with you in case of emergency.           Current Medications     ondansetron (ZOFRAN) 4 MG tablet TAKE 1 TABLET BY MOUTH EVERY DAY AS NEEDED FOR NAUSEA AND VOMITING    prenatal multivit-Ca-min-Fe-FA (PRENATAL VITAMIN) Tab Take 1 tablet by mouth  "once daily.    promethazine (PHENERGAN) 25 MG tablet Take 1 tablet (25 mg total) by mouth every 6 (six) hours as needed for Nausea.           Clinical Reference Information           Prenatal Vitals     Enc. Date GA Prenatal Vitals Prenatal Pulse Pain Level Urine Albumin/Glucose Edema Presentation Dilation/Effacement/Station    2/3/17 36w3d Admission Dept: [facility] L&D    2/1/17 36w1d 122/70 / 98 kg (216 lb 0.8 oz)  / 130 / Decreased (A)   Negative / Negative +2 / +2 / None / No  0 / 50 / -3    1/24/17 35w0d 136/86 / 98.5 kg (217 lb 2.5 oz)  / 146 / Present   Negative / Negative       1/18/17 34w1d Admission Dept: [facility] L&D    1/6/17 32w3d 124/80 / 95 kg (209 lb 7 oz)  / us 144 / Present   Negative / Negative None / None / None / No Vertex     10/20/16 21w2d 118/72 / 95 kg (209 lb 7 oz)  / us 144 / Present  0  None / None / None / No      9/29/16 18w2d 116/64 / 97.6 kg (215 lb 2.7 oz)           9/23/16 17w3d 122/60 / 97 kg (213 lb 13.5 oz)  / us / Absent   Negative / Negative None / None / None / No         TWG: -4.082 kg (-9 lb)   Pregravid weight: 102.1 kg (225 lb)   Number of fetuses: 1   Height: 5' 3" (1.6 m)   BMI: 39.9       Your Vitals Were     Last Period                   05/24/2016           Allergies as of 2/8/2017     No Known Allergies      Immunizations Administered on Date of Encounter - 2/8/2017     None      Orders Placed During Today's Visit      Normal Orders This Visit    US OB/GYN Procedure ([facility])-Today       [facility] Sign-Up     Activating your [facility] account is as easy as 1-2-3!     1) Visit [email or web address], select Sign Up Now, enter this activation code and your date of birth, then select Next.  [email or web address]  Expires: 2/20/2017  3:11 PM      2) Create a username and password to use when you visit [facility] in the future and select a security question in case you lose your password and select Next.    3) Enter your e-mail address and click Sign Up!    Additional Information  If you " have questions, please e-mail [email or web address] or call [phone] to talk to our [facility] staff. Remember, [facility] is NOT to be used for urgent needs. For medical emergencies, dial 911.         Language Assistance Services     ATTENTION: Language assistance services are available, free of charge. Please call [phone].      ATENCIÓN: Si habla español, tiene a koch disposición servicios gratuitos de asistencia lingüística. Llame al [phone].     CHÚ Ý: N?u b?n nói Ti?ng Vi?t, có các d?ch v? h? tr? ngôn ng? mi?n phí dành cho b?n. G?i s? [phone].         [facility] complies with applicable Federal civil rights laws and does not discriminate on the basis of race, color, national origin, age, disability, or sex.

## 2017-02-15 ENCOUNTER — PROCEDURE VISIT (OUTPATIENT)
Dept: OBSTETRICS AND GYNECOLOGY | Facility: CLINIC | Age: 32
End: 2017-02-15
Payer: MEDICAID

## 2017-02-15 ENCOUNTER — ROUTINE PRENATAL (OUTPATIENT)
Dept: OBSTETRICS AND GYNECOLOGY | Facility: CLINIC | Age: 32
End: 2017-02-15
Payer: MEDICAID

## 2017-02-15 ENCOUNTER — ANESTHESIA EVENT (OUTPATIENT)
Dept: OBSTETRICS AND GYNECOLOGY | Facility: HOSPITAL | Age: 32
End: 2017-02-15
Payer: MEDICAID

## 2017-02-15 ENCOUNTER — HOSPITAL ENCOUNTER (INPATIENT)
Facility: HOSPITAL | Age: 32
LOS: 3 days | Discharge: HOME | End: 2017-02-18
Attending: OBSTETRICS & GYNECOLOGY | Admitting: OBSTETRICS & GYNECOLOGY
Payer: MEDICAID

## 2017-02-15 VITALS — SYSTOLIC BLOOD PRESSURE: 132 MMHG | BODY MASS INDEX: 39.95 KG/M2 | DIASTOLIC BLOOD PRESSURE: 84 MMHG | WEIGHT: 225.5 LBS

## 2017-02-15 DIAGNOSIS — O41.03X1: ICD-10-CM

## 2017-02-15 DIAGNOSIS — Z98.891 STATUS POST REPEAT LOW TRANSVERSE CESAREAN SECTION: Primary | ICD-10-CM

## 2017-02-15 DIAGNOSIS — O41.03X1 OLIGOHYDRAMNIOS ANTEPARTUM, THIRD TRIMESTER, FETUS 1: ICD-10-CM

## 2017-02-15 DIAGNOSIS — O34.219 PREVIOUS CESAREAN SECTION COMPLICATING PREGNANCY: Primary | ICD-10-CM

## 2017-02-15 DIAGNOSIS — O99.213 OBESITY COMPLICATING PREGNANCY, THIRD TRIMESTER: ICD-10-CM

## 2017-02-15 DIAGNOSIS — O41.03X1 OLIGOHYDRAMNIOS, THIRD TRIMESTER, FETUS 1: ICD-10-CM

## 2017-02-15 PROBLEM — O41.03X0 OLIGOHYDRAMNIOS IN THIRD TRIMESTER: Status: ACTIVE | Noted: 2017-02-15

## 2017-02-15 PROBLEM — O41.00X0 OLIGOHYDRAMNIOS ANTEPARTUM: Status: ACTIVE | Noted: 2017-02-15

## 2017-02-15 PROBLEM — Z98.51 STATUS POST TUBAL LIGATION: Status: ACTIVE | Noted: 2017-02-15

## 2017-02-15 PROBLEM — O41.00X0 OLIGOHYDRAMNIOS ANTEPARTUM: Status: RESOLVED | Noted: 2017-02-15 | Resolved: 2017-02-15

## 2017-02-15 LAB
ABO + RH BLD: NORMAL
BASOPHILS # BLD AUTO: 0.02 K/UL
BASOPHILS # BLD AUTO: 0.02 K/UL
BASOPHILS NFR BLD: 0.2 %
BASOPHILS NFR BLD: 0.2 %
BLD GP AB SCN CELLS X3 SERPL QL: NORMAL
DIFFERENTIAL METHOD: NORMAL
DIFFERENTIAL METHOD: NORMAL
EOSINOPHIL # BLD AUTO: 0.1 K/UL
EOSINOPHIL # BLD AUTO: 0.1 K/UL
EOSINOPHIL NFR BLD: 0.7 %
EOSINOPHIL NFR BLD: 0.7 %
ERYTHROCYTE [DISTWIDTH] IN BLOOD BY AUTOMATED COUNT: 14 %
ERYTHROCYTE [DISTWIDTH] IN BLOOD BY AUTOMATED COUNT: 14 %
HCT VFR BLD AUTO: 37.8 %
HCT VFR BLD AUTO: 37.8 %
HGB BLD-MCNC: 12.5 G/DL
HGB BLD-MCNC: 12.5 G/DL
LYMPHOCYTES # BLD AUTO: 2.5 K/UL
LYMPHOCYTES # BLD AUTO: 2.5 K/UL
LYMPHOCYTES NFR BLD: 28.9 %
LYMPHOCYTES NFR BLD: 28.9 %
MCH RBC QN AUTO: 30.5 PG
MCH RBC QN AUTO: 30.5 PG
MCHC RBC AUTO-ENTMCNC: 33.1 %
MCHC RBC AUTO-ENTMCNC: 33.1 %
MCV RBC AUTO: 92 FL
MCV RBC AUTO: 92 FL
MONOCYTES # BLD AUTO: 0.6 K/UL
MONOCYTES # BLD AUTO: 0.6 K/UL
MONOCYTES NFR BLD: 6.6 %
MONOCYTES NFR BLD: 6.6 %
NEUTROPHILS # BLD AUTO: 5.4 K/UL
NEUTROPHILS # BLD AUTO: 5.4 K/UL
NEUTROPHILS NFR BLD: 63.6 %
NEUTROPHILS NFR BLD: 63.6 %
PLATELET # BLD AUTO: 170 K/UL
PLATELET # BLD AUTO: 170 K/UL
PMV BLD AUTO: 11.6 FL
PMV BLD AUTO: 11.6 FL
RBC # BLD AUTO: 4.1 M/UL
RBC # BLD AUTO: 4.1 M/UL
WBC # BLD AUTO: 8.52 K/UL
WBC # BLD AUTO: 8.52 K/UL

## 2017-02-15 PROCEDURE — 99999 PR PBB SHADOW E&M-EST. PATIENT-LVL II: CPT | Mod: PBBFAC,,, | Performed by: OBSTETRICS & GYNECOLOGY

## 2017-02-15 PROCEDURE — 88302 TISSUE EXAM BY PATHOLOGIST: CPT | Performed by: PATHOLOGY

## 2017-02-15 PROCEDURE — 37000008 HC ANESTHESIA 1ST 15 MINUTES

## 2017-02-15 PROCEDURE — 63600175 PHARM REV CODE 636 W HCPCS: Performed by: NURSE ANESTHETIST, CERTIFIED REGISTERED

## 2017-02-15 PROCEDURE — 11000001 HC ACUTE MED/SURG PRIVATE ROOM

## 2017-02-15 PROCEDURE — 37000009 HC ANESTHESIA EA ADD 15 MINS

## 2017-02-15 PROCEDURE — 25000003 PHARM REV CODE 250: Performed by: NURSE ANESTHETIST, CERTIFIED REGISTERED

## 2017-02-15 PROCEDURE — 63600175 PHARM REV CODE 636 W HCPCS: Performed by: OBSTETRICS & GYNECOLOGY

## 2017-02-15 PROCEDURE — 25000003 PHARM REV CODE 250: Performed by: OBSTETRICS & GYNECOLOGY

## 2017-02-15 PROCEDURE — 88302 TISSUE EXAM BY PATHOLOGIST: CPT | Mod: 26,,, | Performed by: PATHOLOGY

## 2017-02-15 PROCEDURE — 85025 COMPLETE CBC W/AUTO DIFF WBC: CPT

## 2017-02-15 PROCEDURE — 99213 OFFICE O/P EST LOW 20 MIN: CPT | Mod: TH,S$PBB,, | Performed by: OBSTETRICS & GYNECOLOGY

## 2017-02-15 PROCEDURE — 86850 RBC ANTIBODY SCREEN: CPT

## 2017-02-15 PROCEDURE — 27200688 HC TRAY, SPINAL-HYPER/ ISOBARIC: Performed by: NURSE ANESTHETIST, CERTIFIED REGISTERED

## 2017-02-15 PROCEDURE — 72100002 HC LABOR CARE, 1ST 8 HOURS

## 2017-02-15 PROCEDURE — 36000680 HC C/S TUBAL LIGATION LEVEL I

## 2017-02-15 PROCEDURE — 51702 INSERT TEMP BLADDER CATH: CPT

## 2017-02-15 PROCEDURE — 86900 BLOOD TYPING SEROLOGIC ABO: CPT

## 2017-02-15 PROCEDURE — 76815 OB US LIMITED FETUS(S): CPT | Mod: 26,59,S$PBB, | Performed by: OBSTETRICS & GYNECOLOGY

## 2017-02-15 PROCEDURE — 0UB50ZZ EXCISION OF RIGHT FALLOPIAN TUBE, OPEN APPROACH: ICD-10-PCS | Performed by: OBSTETRICS & GYNECOLOGY

## 2017-02-15 PROCEDURE — 76819 FETAL BIOPHYS PROFIL W/O NST: CPT | Mod: 26,59,S$PBB, | Performed by: OBSTETRICS & GYNECOLOGY

## 2017-02-15 RX ORDER — KETOROLAC TROMETHAMINE 30 MG/ML
INJECTION, SOLUTION INTRAMUSCULAR; INTRAVENOUS
Status: DISCONTINUED | OUTPATIENT
Start: 2017-02-15 | End: 2017-02-15

## 2017-02-15 RX ORDER — AMOXICILLIN 250 MG
1 CAPSULE ORAL NIGHTLY PRN
Status: DISCONTINUED | OUTPATIENT
Start: 2017-02-15 | End: 2017-02-18 | Stop reason: HOSPADM

## 2017-02-15 RX ORDER — MISOPROSTOL 200 UG/1
200 TABLET ORAL ONCE AS NEEDED
Status: ACTIVE | OUTPATIENT
Start: 2017-02-15 | End: 2017-02-15

## 2017-02-15 RX ORDER — ONDANSETRON 2 MG/ML
INJECTION INTRAMUSCULAR; INTRAVENOUS
Status: DISCONTINUED | OUTPATIENT
Start: 2017-02-15 | End: 2017-02-15

## 2017-02-15 RX ORDER — DIPHENHYDRAMINE HYDROCHLORIDE 50 MG/ML
INJECTION INTRAMUSCULAR; INTRAVENOUS
Status: DISCONTINUED | OUTPATIENT
Start: 2017-02-15 | End: 2017-02-15

## 2017-02-15 RX ORDER — ONDANSETRON 8 MG/1
8 TABLET, ORALLY DISINTEGRATING ORAL EVERY 8 HOURS PRN
Status: DISCONTINUED | OUTPATIENT
Start: 2017-02-15 | End: 2017-02-18 | Stop reason: HOSPADM

## 2017-02-15 RX ORDER — IBUPROFEN 800 MG/1
800 TABLET ORAL EVERY 8 HOURS
Status: DISCONTINUED | OUTPATIENT
Start: 2017-02-17 | End: 2017-02-16

## 2017-02-15 RX ORDER — PRENATAL WITH FERROUS FUM AND FOLIC ACID 3080; 920; 120; 400; 22; 1.84; 3; 20; 10; 1; 12; 200; 27; 25; 2 [IU]/1; [IU]/1; MG/1; [IU]/1; MG/1; MG/1; MG/1; MG/1; MG/1; MG/1; UG/1; MG/1; MG/1; MG/1; MG/1
1 TABLET ORAL DAILY
Status: DISCONTINUED | OUTPATIENT
Start: 2017-02-16 | End: 2017-02-18 | Stop reason: HOSPADM

## 2017-02-15 RX ORDER — DIPHENHYDRAMINE HCL 25 MG
25 CAPSULE ORAL EVERY 4 HOURS PRN
Status: DISCONTINUED | OUTPATIENT
Start: 2017-02-15 | End: 2017-02-18 | Stop reason: HOSPADM

## 2017-02-15 RX ORDER — FERROUS SULFATE 325(65) MG
325 TABLET, DELAYED RELEASE (ENTERIC COATED) ORAL
Status: DISCONTINUED | OUTPATIENT
Start: 2017-02-16 | End: 2017-02-18 | Stop reason: HOSPADM

## 2017-02-15 RX ORDER — CEFAZOLIN SODIUM 2 G/50ML
2 SOLUTION INTRAVENOUS
Status: COMPLETED | OUTPATIENT
Start: 2017-02-15 | End: 2017-02-15

## 2017-02-15 RX ORDER — MORPHINE SULFATE 1 MG/ML
INJECTION, SOLUTION EPIDURAL; INTRATHECAL; INTRAVENOUS
Status: DISCONTINUED | OUTPATIENT
Start: 2017-02-15 | End: 2017-02-15

## 2017-02-15 RX ORDER — SIMETHICONE 80 MG
1 TABLET,CHEWABLE ORAL EVERY 6 HOURS PRN
Status: DISCONTINUED | OUTPATIENT
Start: 2017-02-15 | End: 2017-02-18 | Stop reason: HOSPADM

## 2017-02-15 RX ORDER — DIPHENHYDRAMINE HYDROCHLORIDE 50 MG/ML
25 INJECTION INTRAMUSCULAR; INTRAVENOUS EVERY 4 HOURS PRN
Status: DISCONTINUED | OUTPATIENT
Start: 2017-02-15 | End: 2017-02-18 | Stop reason: HOSPADM

## 2017-02-15 RX ORDER — OXYCODONE AND ACETAMINOPHEN 10; 325 MG/1; MG/1
1 TABLET ORAL EVERY 4 HOURS PRN
Status: DISCONTINUED | OUTPATIENT
Start: 2017-02-15 | End: 2017-02-18 | Stop reason: HOSPADM

## 2017-02-15 RX ORDER — KETOROLAC TROMETHAMINE 30 MG/ML
30 INJECTION, SOLUTION INTRAMUSCULAR; INTRAVENOUS EVERY 6 HOURS
Status: DISCONTINUED | OUTPATIENT
Start: 2017-02-16 | End: 2017-02-16

## 2017-02-15 RX ORDER — BISACODYL 10 MG
10 SUPPOSITORY, RECTAL RECTAL ONCE AS NEEDED
Status: ACTIVE | OUTPATIENT
Start: 2017-02-15 | End: 2017-02-15

## 2017-02-15 RX ORDER — ACETAMINOPHEN 325 MG/1
650 TABLET ORAL EVERY 6 HOURS PRN
Status: DISCONTINUED | OUTPATIENT
Start: 2017-02-15 | End: 2017-02-18 | Stop reason: HOSPADM

## 2017-02-15 RX ORDER — OXYTOCIN/RINGER'S LACTATE 20/1000 ML
PLASTIC BAG, INJECTION (ML) INTRAVENOUS CONTINUOUS PRN
Status: DISCONTINUED | OUTPATIENT
Start: 2017-02-15 | End: 2017-02-15

## 2017-02-15 RX ORDER — AMMONIA 15 % (W/V)
0.3 AMPUL (EA) INHALATION CONTINUOUS PRN
Status: DISCONTINUED | OUTPATIENT
Start: 2017-02-15 | End: 2017-02-18 | Stop reason: HOSPADM

## 2017-02-15 RX ORDER — DOCUSATE SODIUM 100 MG/1
100 CAPSULE, LIQUID FILLED ORAL DAILY
Status: DISCONTINUED | OUTPATIENT
Start: 2017-02-16 | End: 2017-02-18 | Stop reason: HOSPADM

## 2017-02-15 RX ORDER — PHENYLEPHRINE HYDROCHLORIDE 10 MG/ML
INJECTION INTRAVENOUS
Status: DISCONTINUED | OUTPATIENT
Start: 2017-02-15 | End: 2017-02-15

## 2017-02-15 RX ORDER — OXYCODONE AND ACETAMINOPHEN 5; 325 MG/1; MG/1
1 TABLET ORAL EVERY 4 HOURS PRN
Status: DISCONTINUED | OUTPATIENT
Start: 2017-02-15 | End: 2017-02-18 | Stop reason: HOSPADM

## 2017-02-15 RX ORDER — ZOLPIDEM TARTRATE 5 MG/1
5 TABLET ORAL NIGHTLY PRN
Status: DISCONTINUED | OUTPATIENT
Start: 2017-02-15 | End: 2017-02-18 | Stop reason: HOSPADM

## 2017-02-15 RX ORDER — HYDROCORTISONE 25 MG/G
CREAM TOPICAL 3 TIMES DAILY PRN
Status: DISCONTINUED | OUTPATIENT
Start: 2017-02-15 | End: 2017-02-18 | Stop reason: HOSPADM

## 2017-02-15 RX ORDER — SODIUM CHLORIDE, SODIUM LACTATE, POTASSIUM CHLORIDE, CALCIUM CHLORIDE 600; 310; 30; 20 MG/100ML; MG/100ML; MG/100ML; MG/100ML
INJECTION, SOLUTION INTRAVENOUS CONTINUOUS
Status: DISCONTINUED | OUTPATIENT
Start: 2017-02-15 | End: 2017-02-15

## 2017-02-15 RX ORDER — OXYTOCIN/RINGER'S LACTATE 20/1000 ML
41.65 PLASTIC BAG, INJECTION (ML) INTRAVENOUS CONTINUOUS
Status: ACTIVE | OUTPATIENT
Start: 2017-02-15 | End: 2017-02-16

## 2017-02-15 RX ADMIN — EPHEDRINE SULFATE 5 MG: 50 INJECTION, SOLUTION INTRAMUSCULAR; INTRAVENOUS; SUBCUTANEOUS at 08:02

## 2017-02-15 RX ADMIN — DIPHENHYDRAMINE HYDROCHLORIDE 25 MG: 50 INJECTION, SOLUTION INTRAMUSCULAR; INTRAVENOUS at 10:02

## 2017-02-15 RX ADMIN — MORPHINE SULFATE 200 MCG: 1 INJECTION, SOLUTION EPIDURAL; INTRATHECAL; INTRAVENOUS at 08:02

## 2017-02-15 RX ADMIN — CEFAZOLIN SODIUM 2 G: 2 SOLUTION INTRAVENOUS at 08:02

## 2017-02-15 RX ADMIN — SODIUM CHLORIDE, SODIUM LACTATE, POTASSIUM CHLORIDE, AND CALCIUM CHLORIDE 1000 ML: .6; .31; .03; .02 INJECTION, SOLUTION INTRAVENOUS at 07:02

## 2017-02-15 RX ADMIN — SODIUM CHLORIDE, SODIUM LACTATE, POTASSIUM CHLORIDE, AND CALCIUM CHLORIDE: 600; 310; 30; 20 INJECTION, SOLUTION INTRAVENOUS at 08:02

## 2017-02-15 RX ADMIN — PHENYLEPHRINE HYDROCHLORIDE 100 MCG: 10 INJECTION INTRAVENOUS at 08:02

## 2017-02-15 RX ADMIN — KETOROLAC TROMETHAMINE 30 MG: 30 INJECTION, SOLUTION INTRAMUSCULAR; INTRAVENOUS at 09:02

## 2017-02-15 RX ADMIN — Medication: at 09:02

## 2017-02-15 RX ADMIN — DIPHENHYDRAMINE HYDROCHLORIDE 50 MG: 50 INJECTION INTRAMUSCULAR; INTRAVENOUS at 09:02

## 2017-02-15 RX ADMIN — ONDANSETRON 4 MG: 2 INJECTION, SOLUTION INTRAMUSCULAR; INTRAVENOUS at 08:02

## 2017-02-15 NOTE — IP AVS SNAPSHOT
[facility]  [id number] [place] [address] [address] [place] [address]           Patient Discharge Instructions     Our goal is to set you up for success. This packet includes information on your condition, medications, and your home care. It will help you to care for yourself so you don't get sicker and need to go back to the hospital.     Please ask your nurse if you have any questions.        There are many details to remember when preparing to leave the hospital. Here is what you will need to do:    1. Take your medicine. If you are prescribed medications, review your Medication List in the following pages. You may have new medications to  at the pharmacy and others that you'll need to stop taking. Review the instructions for how and when to take your medications. Talk with your doctor or nurses if you are unsure of what to do.     2. Go to your follow-up appointments. Specific follow-up information is listed in the following pages. Your may be contacted by a transition nurse or clinical provider about future appointments. Be sure we have all of the phone numbers to reach you, if needed. Please contact your provider's office if you are unable to make an appointment.     3. Watch for warning signs. Your doctor or nurse will give you detailed warning signs to watch for and when to call for assistance. These instructions may also include educational information about your condition. If you experience any of warning signs to your health, call your doctor.               [facility]  Unless otherwise directed by your provider, please contact [facility], our nurse care line that is available for 24/7 assistance.     [phone] (toll-free)    Registered nurses in the [facility] provide clinical advisement, health education, appointment booking, and other advisory services.                    ** Verify the list of medication(s) below is accurate and up to date. Carry this with you  in case of emergency. If your medications have changed, please notify your healthcare provider.             Medication List      START taking these medications        Additional Info                      oxycodone-acetaminophen 5-325 mg per tablet   Commonly known as:  PERCOCET   Quantity:  25 tablet   Refills:  0   Dose:  1 tablet    Instructions:  Take 1 tablet by mouth every 4 (four) hours as needed for Pain.     Begin Date    AM    Noon    PM    Bedtime         CONTINUE taking these medications        Additional Info                      prenatal multivit-Ca-min-Fe-FA Tab   Commonly known as:  PRENATAL VITAMIN   Quantity:  30 each   Refills:  11   Dose:  1 tablet   Comments:  Any PNV is OK    Instructions:  Take 1 tablet by mouth once daily.     Begin Date    AM    Noon    PM    Bedtime         STOP taking these medications     ondansetron 4 MG tablet   Commonly known as:  ZOFRAN            Where to Get Your Medications      These medications were sent to [facility] [address] [place], [place] - [address] AT [facility]   [address] [facility] [address]     Phone:  [phone]     oxycodone-acetaminophen 5-325 mg per tablet                  Please bring to all follow up appointments:    1. A copy of your discharge instructions.  2. All medicines you are currently taking in their original bottles.  3. Identification and insurance card.    Please arrive 15 minutes ahead of scheduled appointment time.    Please call 24 hours in advance if you must reschedule your appointment and/or time.        Your Scheduled Appointments     Feb 22, 2017  3:00 PM CST   Nurse Visit with OB GYN NURSE, PHYLICIA Martinez - OB/ GYN ([facility])    [address] [place] [address] [phone]            Mar 22, 2017  2:00 PM CDT   Post Partum with MD Michelle Oviedo - OB/ GYN ([facility])    [address] [place] [address] [phone]               Your Future Surgeries/Procedures     Feb 22, 2017   Surgery with Artur Neville MD   [facility] ([facility])    [address]  [facility] [id number] [phone]              Follow-up Information     Follow up with Artur Neville MD. Go on 3/22/2017.    Specialty:  Obstetrics and Gynecology    Why:  postpartum and post op follow up    Contact information:    [address]  [facility] [address] [phone]          Follow up with Artur Neville MD In 1 week.    Specialty:  Obstetrics and Gynecology    Why:  For post operative follow up     Contact information:    [address]  [facility] [address] [phone]          Follow up with NURSE, OB-GYN. Go on 2/22/2017.    Specialty:  Obstetrics and Gynecology    Why:  dressing removal        Discharge Instructions     Future Orders    Activity as tolerated     Call MD for:  difficulty breathing, headache or visual disturbances     Call MD for:  extreme fatigue     Call MD for:  hives     Call MD for:  persistent dizziness or light-headedness     Call MD for:  persistent nausea and vomiting     Call MD for:  redness, tenderness, or signs of infection (pain, swelling, redness, odor or green/yellow discharge around incision site)     Call MD for:  severe uncontrolled pain     Call MD for:  temperature >100.4     Call MD for:     Comments:    Heavy vaginal bleeding or clots larger than the size of an egg that is lasting longer than 60 minutes   Expect some bleeding and small clots for up to 6 weeks following delivery    Diet general     Questions:    Total calories:      Fat restriction, if any:      Protein restriction, if any:      Na restriction, if any:      Fluid restriction:      Additional restrictions:      Lifting restrictions     Comments:    Don not lift anything heavier than your baby for 6 weeks    No dressing needed     Comments:    Remove steri strips 7 days after surgery unless they have already  "fallen off    Other restrictions (specify):     Comments:    Avoid vaginal intercourse for at least 4 weeks    Shower on day dressing removed (No bath)     Comments:    May start to bath one week after  section, shower ok day one after surgery        Discharge Instructions       Mother Self Care:    Activity: Avoid strenuous exercise and get adequate rest.  No driving until the physician consent given.  Emotional Changes: Most women find birth to be a time of great emotional upheaval.  Sense of loss, mood swings, fatigue, anxiety, and feeling "let down" are common.  If feelings worsen or last more than a week, call your physician.  Breast Care/Breastfeeding: Wear a bra for comfort.  Keep nipples dry and apply your own breast milk or lanolin cream as needed for soreness.  Engorgement can be relieved with warm, moist heat before feedings.  You may also take Ibuprofen.  Breast Care/Bottle Feeding: Wear support bra 24 hours a day for one week.  Avoid stimulation to breasts.  You may use ice packs for discomfort.  Riky-Care/Vaginal Bleeding: Remember to use your riky-bottle after urinating.  Your flow will change from red, to pink, to yellow/white color over a period of 2 weeks.  Menstruation will return in 3-8 weeks, or longer if breastfeeding.  Episiotomy Vaginal Delivery: Stitches will dissolve within 10 days to 3 weeks.  Warm baths, tucks, and dermoplast will promote healing.  Avoid bubble baths or strong soaps.   Section/Tubal Ligation: Keep incision clean and dry.  Please remove steri-strips in 5-7 days.  You may shower, but avoid baths.  Sexual Activity/Pelvic Rest: No sexual activity, tampons, or douching until your physician gives you consent.  Diet: Continue to eat from the five basic food groups, including plenty of protein, fruits, vegetables, and whole grains.  Limit empty calories and high fat foods.  Drink enough fluids to satisfy thirst and add an extra 500 calories for " "breastfeeding.  Constipation/Hemorrhoids: Drink plenty of water.  You may take a stool softener or natural laxative (Metamucil). You may use tucks or hemorrhoid ointment and soak in a warm tub.    CALL YOUR OB DOCTOR IF ANY OF THE FOLLOWING OCCURS:  *Heavy bleeding - saturating a pad an hour or passing any large (2-3 inches in size) blood clots.  *Any pain, redness, or tenderness in lower leg.  *You cannot care for yourself or your baby.  *Any signs of infection-      - Temperature greater than 100.5 degrees F      - Foul smelling vaginal discharge and/or incisional drainage      - Increased episiotomy or incisional pain      - Hot, hard, red or sore area on breast      - Flu-like symptoms      - Any urgency, frequency or burning with urination    Discharge References/Attachments     AFTER DELIVERY: WHEN TO CALL THE HEALTH CARE PROVIDER (ENGLISH)     SECTION (), DISCHARGE INSTRUCTIONS FOR (ENGLISH)    BREASTFEEDING: CARING FOR YOURSELF (ENGLISH)        Primary Diagnosis     Your primary diagnosis was:  Status Post Repeat Low Transverse  Section      Admission Information     Date & Time Provider Department CSN    2/15/2017  5:24 PM Artur Neville MD [facility] [id number]      Care Providers     Provider Role Specialty Primary office phone    Artur Neville MD Attending Provider Obstetrics and Gynecology [phone]    Shelli Black MD Surgeon  Obstetrics [phone]    Artur Neville MD Surgeon  Obstetrics and Gynecology [phone]      Your Vitals Were     BP Pulse Temp Resp Height Weight    128/81 79 98.4 °F (36.9 °C) (Oral) 18 5' 3" (1.6 m) 101.6 kg (224 lb)    Last Period SpO2 BMI          2016 97% 39.68 kg/m2        Recent Lab Values     No lab values to display.      Pending Labs     Order Current Status    RPR In process      Allergies as of 2017     No Known Allergies      Advance Directives     An advance directive is a document which, in the event " you are no longer able to make decisions for yourself, tells your healthcare team what kind of treatment you do or do not want to receive, or who you would like to make those decisions for you.  If you do not currently have an advance directive, [facility] encourages you to create one.  For more information call:  [phone] ([phone]), [phone] ([phone]),  or log on to [email or web address].        Smoking Cessation     If you would like to quit smoking:   You may be eligible for free services if you are a [place] resident and started smoking cigarettes before September 1, 1988.  Call the Smoking Cessation Trust (SCT) toll free at (964) 150-5325 or (693) 613-4700.   Call 2-474-QUIT-NOW if you do not meet the above criteria.            Language Assistance Services     ATTENTION: Language assistance services are available, free of charge. Please call [phone].      ATENCIÓN: Si habla angus, tiene a koch disposición servicios gratuitos de asistencia lingüística. Llame al [phone].     [facility] Ý: N?u b?n nói Ti?ng Vi?t, có các d?ch v? h? tr? ngôn ng? mi?n phí dành cho b?n. G?i s? [phone].        [facility] Sign-Up     Activating your [facility] account is as easy as 1-2-3!     1) Visit [email or web address], select Sign Up Now, enter this activation code and your date of birth, then select Next.  [email or web address]  Expires: 2/20/2017  3:11 PM      2) Create a username and password to use when you visit [facility] in the future and select a security question in case you lose your password and select Next.    3) Enter your e-mail address and click Sign Up!    Additional Information  If you have questions, please e-mail [email or web address] or call [phone] to talk to our [facility] staff. Remember, [facility] is NOT to be used for urgent needs. For medical emergencies, dial 911.          [facility] complies with applicable Federal civil rights laws and does not discriminate on  the basis of race, color, national origin, age, disability, or sex.

## 2017-02-15 NOTE — PATIENT INSTRUCTIONS
Birth ()  A  birth is the surgical delivery of a baby through an incision in the mothers stomach.  births may be planned and scheduled. But, in many cases, a  is unexpected. In any case, a  is done to make sure that you and your baby have the safest birth.   Preparing for the birth  The preparation for the birth is nearly the same whether scheduled or unscheduled. Surgery will begin shortly after you receive anesthesia. You will receive either regional or general anesthesia. Most cesareans are completed in less than an hour. During the birth, your healthcare team is with you, ready to take care of you and your . Your partner may also be with you for the birth.  Making the incisions  In a  birth, incisions are made in both the skin and the uterus. Either incision may be transverse (from side to side) or vertical. Your skin and uterine incisions may differ. Be sure they are noted in your health records:  · The skin incision is usually transverse (side to side). It is located at the pubic hairline. A vertical incision may be used if youve had this incision before or if the  needs to be done quickly.  · The uterine incision is almost always transverse. A transverse incision heals very well. This may allow for a future vaginal birth (). In certain cases, a vertical uterine incision may be made.      Transverse skin incision or vertical skin incision.      Transverse uterine incision or vertical uterine incision.      Your babys birth  Once the incisions are made, the healthcare provider presses on the top of the uterus and guides the baby through the incision. The cord will be clamped and cut. Then the placenta is lifted out through the incision.  Taking care of you  After your babys birth, the uterine incision is closed with stitches. Your skin incision will be closed with surgical staples or stitches and a dressing will be applied. Your  healthcare provider will press on your uterus. This helps expel blood clots through the vagina. You may be given medicines to help shrink your uterus and decrease bleeding. You may also receive antibiotics to reduce any risk of infection.  Taking care of your baby  While your surgery is completed, your baby will be placed in an infant warmer. Gentle suction will be used to help remove excess fluid from the babys mouth and airways. Then the APGAR score will be done. This rates babys appearance (color), pulse (heart rate), grimace (muscle reflex), activity, and respiration. Your baby will be wrapped in a blanket and brought to you. Now, for the first time, youll see your .  Risks of   As with any surgery,  birth has risks. Your healthcare provider will discuss the risks of  with you. They may include:  · Bleeding  · Infection  · Injury to nearby organs  · Blood clots in the legs, pelvis, and/or lungs  · Reaction to anesthesia   Date Last Reviewed: 2015-2016 [employer]. [address] [place] [address]. All rights reserved. This information is not intended as a substitute for professional medical care. Always follow your healthcare professional's instructions.        Before a  Birth  Whether a  is scheduled or not, the preparation is nearly the same. Either way, your healthcare team will get you ready for surgery. If you have a partner, he or she can often stay with you for most of this time.  For all cesareans  All  births, scheduled or not, require certain steps. To prepare for surgery:  · Youll need to sign a consent form.  · Any hair on your stomach may be removed to just below the top of your pubic bone. (Do not do this yourself.)  · A wash that cleans and disinfects the skin will be applied to your belly.  · An IV (intravenous) line will be started to supply medications and fluids.  · A catheter (small tube) will be  placed in your bladder to drain urine.  · A fetal monitor may be used to check your babys heart rate.  · You will be given a spinal block, epidural block, or general anesthesia.  For a scheduled   Before scheduling a , tests may be done to confirm your due date. This helps to make sure that your baby is ready to be born when the  is performed. Cesareans are often scheduled near the 39th week of pregnancy. Do not eat or drink anything after midnight on the night before surgery.  Notes for your partner  In most cases, you can stay with the mother while she is being prepared for surgery. She may be feeling tense. Help her relax. Your support can mean a lot. If youll be staying with her during the , you may be asked to wash your hands and put on special clothes.   Date Last Reviewed: 2015  © 7672-8880 [employer]. [address] [place] [address], [place] [address]. All rights reserved. This information is not intended as a substitute for professional medical care. Always follow your healthcare professional's instructions.

## 2017-02-15 NOTE — PROGRESS NOTES
BPP 6/8; vtx, MVP 1.3 PAU 2.6; oligohydramnios  Dr. Neville and L&D notified   Patient to report to labor and delivery for repeat  with BTL  Ate @ 10:30 am; fluids @ 3:00 pm

## 2017-02-16 PROCEDURE — 11000001 HC ACUTE MED/SURG PRIVATE ROOM

## 2017-02-16 PROCEDURE — 25000003 PHARM REV CODE 250: Performed by: OBSTETRICS & GYNECOLOGY

## 2017-02-16 PROCEDURE — 63600175 PHARM REV CODE 636 W HCPCS: Performed by: OBSTETRICS & GYNECOLOGY

## 2017-02-16 RX ORDER — IBUPROFEN 800 MG/1
800 TABLET ORAL EVERY 8 HOURS
Status: DISCONTINUED | OUTPATIENT
Start: 2017-02-16 | End: 2017-02-18 | Stop reason: HOSPADM

## 2017-02-16 RX ADMIN — VITAMIN A ACETATE, .BETA.-CAROTENE, ASCORBIC ACID, CHOLECALCIFEROL, .ALPHA.-TOCOPHEROL ACETATE, DL-, THIAMINE MONONITRATE, RIBOFLAVIN, NIACINAMIDE, PYRIDOXINE HYDROCHLORIDE, FOLIC ACID, CYANOCOBALAMIN, CALCIUM CARBONATE, FERROUS FUMARATE, ZINC OXIDE, AND CUPRIC OXIDE 1 EACH: 2000; 2000; 120; 400; 22; 1.84; 3; 20; 10; 1; 12; 200; 27; 25; 2 TABLET ORAL at 08:02

## 2017-02-16 RX ADMIN — FERROUS SULFATE TAB EC 325 MG (65 MG FE EQUIVALENT) 325 MG: 325 (65 FE) TABLET DELAYED RESPONSE at 08:02

## 2017-02-16 RX ADMIN — OXYCODONE HYDROCHLORIDE AND ACETAMINOPHEN 1 TABLET: 10; 325 TABLET ORAL at 02:02

## 2017-02-16 RX ADMIN — OXYCODONE HYDROCHLORIDE AND ACETAMINOPHEN 1 TABLET: 10; 325 TABLET ORAL at 10:02

## 2017-02-16 RX ADMIN — KETOROLAC TROMETHAMINE 30 MG: 30 INJECTION, SOLUTION INTRAMUSCULAR at 08:02

## 2017-02-16 RX ADMIN — KETOROLAC TROMETHAMINE 30 MG: 30 INJECTION, SOLUTION INTRAMUSCULAR at 03:02

## 2017-02-16 RX ADMIN — OXYCODONE HYDROCHLORIDE AND ACETAMINOPHEN 1 TABLET: 10; 325 TABLET ORAL at 06:02

## 2017-02-16 RX ADMIN — IBUPROFEN 800 MG: 800 TABLET ORAL at 10:02

## 2017-02-16 RX ADMIN — DOCUSATE SODIUM 100 MG: 100 CAPSULE, LIQUID FILLED ORAL at 09:02

## 2017-02-16 RX ADMIN — OXYCODONE HYDROCHLORIDE AND ACETAMINOPHEN 1 TABLET: 10; 325 TABLET ORAL at 05:02

## 2017-02-16 RX ADMIN — OXYCODONE HYDROCHLORIDE AND ACETAMINOPHEN 1 TABLET: 10; 325 TABLET ORAL at 12:02

## 2017-02-16 NOTE — NURSING
18g to Left AC patent, flushes.  Pt received Toradol as scheduled without difficulty. IV flushed and leaking noted.  No warmth, redness or swelling noted.  IV removed without difficulty.  Catheter intact.

## 2017-02-16 NOTE — ANESTHESIA POSTPROCEDURE EVALUATION
Anesthesia Post Evaluation    Patient: Mago Mcallister    Procedure(s) Performed: Procedure(s) (LRB):  DELIVERY- SECTION WITH TUBAL (N/A)    Final Anesthesia Type: spinal  Patient location during evaluation: labor & delivery  Patient participation: Yes- Able to Participate  Level of consciousness: awake and alert and oriented  Post-procedure vital signs: reviewed and stable  Pain management: adequate  Airway patency: patent  PONV status at discharge: No PONV  Anesthetic complications: no      Cardiovascular status: blood pressure returned to baseline  Respiratory status: unassisted, spontaneous ventilation and room air  Hydration status: euvolemic  Follow-up not needed.        Visit Vitals    /61 (Patient Position: Sitting)    Pulse 97    Temp 36.5 °C (97.7 °F) (Oral)    Resp 20    LMP 2016    SpO2 96%    Breastfeeding No       Pain/Wilner Score: No Data Recorded

## 2017-02-16 NOTE — LACTATION NOTE
Lactation called to bedside to assist with feeding. Mother positions infant to the left breast in the football position, mother obtains deep latch easily & independently, however infant unlatches after a couple of suckles. Mother hand expresses EBM into infants mouth, infant is eager to feed. After many attempts to feed infant at the breast & infant unable to maintain latch, mother and IBCLC decide to feed EBM at this time. Hand expression reviewed with mother, 8 mL colostrum collected. Taught mother how to safely syringe feed infant. Infant now content and asleep.    Plan:   Feed based on cues, skin to skin every 3 hours if no feeding cues present.  Attempt to latch infant to the breast at each feeding   If infant latches and feeds well with many swallows, allow infant to feed until content   If infant is unable to maintain latch within 15 minutes of trying or infant becomes frustrated, hand express and  syringe feed infant all available EBM.  If infant does not maintain latch at the breast by 24 hours of age, initiate the use of double electric breast pump.    Mother agrees with plan of care, teach back method utilized. Mother to call for assistance as needed.

## 2017-02-16 NOTE — PLAN OF CARE
Problem: Patient Care Overview  Goal: Plan of Care Review  Outcome: Ongoing (interventions implemented as appropriate)  Repeat C/S @ 2102. No complications. APGARS 9/9. Bleeding light to moderate throughout recovery. First bag of Pitocin infusing. Aqacel in place with small marked area of drainage. Received Percocet for pain in recovery. Benadryl for itching. Machado in place, draining without difficulty. SCDs in place. Bonding with infant appropriately.

## 2017-02-16 NOTE — ANESTHESIA RELEASE NOTE
Anesthesia Release from PACU Note    Patient: Mago Mcallister    Procedure(s) Performed: Procedure(s) (LRB):  DELIVERY- SECTION WITH TUBAL (N/A)    Anesthesia type: spinal    Post pain: Adequate analgesia    Post assessment: no apparent anesthetic complications and tolerated procedure well    Last Vitals:   Visit Vitals    /61 (Patient Position: Sitting)    Pulse 97    Temp 36.5 °C (97.7 °F) (Oral)    Resp 20    LMP 2016    SpO2 96%    Breastfeeding No       Post vital signs: stable    Level of consciousness: awake, alert  and oriented    Nausea/Vomiting: no nausea/no vomiting    Complications: none    Airway Patency: patent    Respiratory: unassisted, spontaneous ventilation, room air    Cardiovascular: stable and blood pressure at baseline    Hydration: euvolemic

## 2017-02-16 NOTE — PLAN OF CARE
Problem: Patient Care Overview  Goal: Plan of Care Review  Outcome: Ongoing (interventions implemented as appropriate)  PP R C/S with BTL. VSS. Toradol given for pain. Breastfeeding. Infant having trouble staying latched on. Instructed on hand expression when infant not latching well. No further complications at this time.

## 2017-02-16 NOTE — H&P
[facility] -   History & Physical  Obstetrics      SUBJECTIVE:     Chief Complaint/Reason for Admission: low fluid    History of Present Illness:  Mago Mcallister is a 31 y.o.  female with an Estimated Date of Delivery: 17 admitted for  with tubal sterilization.  Her current obstetrical history is significant for prior , desire for tubal sterilization, and oligohydramnios diagnosed by ultrasound in clinic this afternoon.  She reports no complaints. Fetal Movement: normal.    PTA Medications   Medication Sig    ondansetron (ZOFRAN) 4 MG tablet TAKE 1 TABLET BY MOUTH EVERY DAY AS NEEDED FOR NAUSEA AND VOMITING    prenatal multivit-Ca-min-Fe-FA (PRENATAL VITAMIN) Tab Take 1 tablet by mouth once daily.       Review of patient's allergies indicates:  No Known Allergies     Past Medical History   Diagnosis Date    ADHD (attention deficit hyperactivity disorder)     Ectopic pregnancy, tubal      Left x 2    Endometriosis     History of ovarian cyst     History of pelvic hematoma      x 2     Past Surgical History   Procedure Laterality Date     section      Tonsillectomy      Tympanostomy tube placement      Salpingectomy Left     Ectopic pregnancy surgery Left      x 2     laparoscopic surgery       x 2     Family History   Problem Relation Age of Onset    Graves' disease Father     Hypertension Father     Diabetes Other     Breast cancer Neg Hx     Colon cancer Neg Hx     Ovarian cancer Neg Hx      Social History   Substance Use Topics    Smoking status: Former Smoker     Types: Cigarettes     Quit date: 2015    Smokeless tobacco: Never Used    Alcohol use No       Review of Systems  Constitutional: no fever or chills, pain not controlled, negative for anorexia, fevers, malaise and weight loss  Respiratory: no cough or shortness of breath  Cardiovascular: no chest pain or palpitations  Gastrointestinal: no nausea or vomiting, tolerating  diet  Genitourinary: no hematuria or dysuria  Neurological: no seizures or tremors  Behavioral/Psych: no auditory or visual hallucinations     OBJECTIVE:     Vital Signs (Most Recent):       Physical Exam:  General:  alert, normal appearing gravid female, appears stated age and no distress   Skin:  Skin color, texture, turgor normal. No rashes or lesions   HEENT:  conjunctivae/corneas clear. PERRL.   Lungs:  clear to auscultation bilaterally   Heart:  regular rate and rhythm   Breasts:  no discharge, erythema, or tenderness   Abdomen:  soft, non-tender; bowel sounds normal   Uterine Size:  size equals dates   Presentations:  cephalic   FHT:  140 BPM   Pelvis: Exam deferred.   Cervix:     Dilation: -    Effacement: -    Station:  -    Consistency: -    Position: -     Laboratory:  Lab Results   Component Value Date    GROUPTRH O POS 2016    HEPBSAG Negative 2016        Diagnostic Results:  Labs: Reviewed  US: Reviewed    ASSESSMENT/PLAN:     38w1d gestation.  Not in labor.   Conditions: Oligohydramnios, Prior  and Undesired Fertility     Risks, benefits, alternatives and possible complications have been discussed in detail with the patient.  Pre-admission, admission, and post admission procedures and expectations were discussed in detail.  All questions answered, all appropriate consents will be signed at the Hospital. Admission is planned for today.   Intervention: plan  delivery and BTL today

## 2017-02-16 NOTE — LACTATION NOTE
Lactation Rounds: Lactation packet given and admit information reviewed. Mother verbalizes understanding of expected  behaviors and output for the first 48 hours of life.  Discussed the importance of cue based feedings on demand, unrestricted access to the breast, and frequent uninterrupted skin to skin contact.  Risk and implications of artificial nipples and supplementation discussed. Mother states that infant is having difficulty maintaining latch so she has hand expressed and fed infant EBM.  Instructed mother to call for latch assistance when infant is showing signs of hunger for further lactation assessment. Anticipate infant will latch and feed effectively at the breast by 24 hours of age; if not initiate pumping & feed EBM via alternative feeding method.       17 0915   Infant Information   Infant's Name [place]   Infant Assessment   Number of Stools (24 hours) 1   Number of Voids (24 hours) 1   Maternal Infant Feeding   Breastfeeding Education adequate infant intake;adequate milk volume;importance of skin-to-skin contact   Breastfeeding History   Breastfeeding History yes   Previous Exclusive Breastfeeding no   Previous Breastfeeding Success unsuccessful  (unalbe to latch)   Previous Breastfeeding Problems other (see comments)  (unalbe to latch)   Lactation Interventions   Attachment Promotion counseling provided;infant-mother separation minimized;rooming-in promoted;skin-to-skin contact encouraged   Breastfeeding Assistance both breasts offered each feeding;feeding cue recognition promoted;feeding on demand promoted;support offered   Maternal Breastfeeding Support lactation counseling provided;encouragement offered

## 2017-02-16 NOTE — PROGRESS NOTES
Spoke with Dr. OSMANY Daniel.  Okay to stop Toradol at this time and begin ibuprofen at midnight.  Lena Esposito RN

## 2017-02-16 NOTE — PLAN OF CARE
Problem: Patient Care Overview  Goal: Plan of Care Review  Outcome: Ongoing (interventions implemented as appropriate)  Patient progressing well.  Pain well controlled with medication.  Bonding well with infant.

## 2017-02-16 NOTE — ANESTHESIA PROCEDURE NOTES
Spinal    Diagnosis: Intrauterine pregnancy  Patient location during procedure: OR  Start time: 2/15/2017 8:25 PM  Timeout: 2/15/2017 8:25 PM  End time: 2/15/2017 8:33 PM  Staffing  Anesthesiologist: MARGARET WELLS  Performed by: anesthesiologist   Preanesthetic Checklist  Completed: patient identified, surgical consent, pre-op evaluation, timeout performed, IV checked, risks and benefits discussed and monitors and equipment checked  Spinal Block  Patient position: sitting  Prep: Betadine  Patient monitoring: heart rate, cardiac monitor and continuous pulse ox  Approach: midline  Location: L3-4  Injection technique: single shot  CSF Fluid: clear free-flowing CSF  Needle  Needle type: pencil-tip   Needle gauge: 25 G  Needle length: 3.5 in  Additional Documentation: incremental injection, negative aspiration for heme and no paresthesia on injection  Needle localization: anatomical landmarks  Assessment  Sensory level: T7   Dermatomal levels determined by alcohol wipe  Ease of block: easy  Patient's tolerance of the procedure: comfortable throughout block  Medications:  Bolus administered: 1.8 mL of 0.75 and with dextrose bupivacaine  Opioid administered: 200 mcg of   morphine  Additional Notes  Easy insertion; epinephrine 50 mcg (epi wash) added for increased duration as pt also having BTL; no complications

## 2017-02-16 NOTE — ANESTHESIA PREPROCEDURE EVALUATION
02/15/2017  Mago Mcallister is a 31 y.o., female.    [facility] Pre-op Assessment    I have reviewed the Patient Summary Reports.     I have reviewed the Nursing Notes.   I have reviewed the Medications.     Review of Systems  Anesthesia Hx:  No problems with previous Anesthesia  History of prior surgery of interest to airway management or planning: Previous anesthesia: General, Epidural Denies Family Hx of Anesthesia complications.   Denies Personal Hx of Anesthesia complications.   Social:  Former Smoker, No Alcohol Use    Hematology/Oncology:  Hematology Normal   Oncology Normal     EENT/Dental:EENT/Dental Normal   Cardiovascular:   Hypertension, poorly controlled Gestional HTN with 2 previous pregnancy   Pulmonary:  Pulmonary Normal    Renal/:  Renal/ Normal     Hepatic/GI:  Hepatic/GI Normal    Musculoskeletal:  Musculoskeletal Normal    Neurological:  Neurology Normal    Endocrine:  Endocrine Normal    Psych:   Psychiatric History Pt denies ADHD         Physical Exam   Airway/Jaw/Neck:  Airway Findings: Mouth Opening: Normal Tongue: Normal  General Airway Assessment: Adult  Mallampati: III  Improves to II with phonation.  TM Distance: Normal, at least 6 cm      Dental:  Dental Findings: Periodontal disease, Severe    Chest/Lungs:  Chest/Lungs Findings: Clear to auscultation, Normal Respiratory Rate     Heart/Vascular:  Heart Findings: Rate: Normal        Mental Status:  Mental Status Findings:  Cooperative, Alert and Oriented         Anesthesia Plan  Type of Anesthesia, risks & benefits discussed:  Anesthesia Type:  spinal  Patient's Preference:   Intra-op Monitoring Plan:   Intra-op Monitoring Plan Comments:   Post Op Pain Control Plan:   Post Op Pain Control Plan Comments:   Induction:    Beta Blocker:  Patient is not currently on a Beta-Blocker (No further documentation required).       Informed  Consent: Patient understands risks and agrees with Anesthesia plan.  Questions answered. Anesthesia consent signed with patient.  ASA Score: 2     Day of Surgery Review of History & Physical: I have interviewed and examined the patient. I have reviewed the patient's H&P dated:  There are no significant changes.

## 2017-02-16 NOTE — LACTATION NOTE
"   02/16/17 0950   Maternal Infant Assessment   Breast Shape Bilateral:;round   Breast Density Bilateral:;soft   Areola Bilateral:;elastic   Nipple(s) Bilateral:;everted   Infant Assessment   Sucking Reflex present   Rooting Reflex present   Swallow Reflex present   LATCH Score   Latch 1-->repeated attempts, holds nipple in mouth, stimulate to suck   Audible Swallowing 0-->none   Type Of Nipple 2-->everted (after stimulation)   Comfort (Breast/Nipple) 2-->soft/nontender   Hold (Positioning) 2-->no assist from staff, mother able to position/hold infant   Score (less than 7 for 2/more consecutive times, consult Lactation Consultant) 7   Breasts WDL   Breasts WDL WDL   Maternal Infant Feeding   Maternal Emotional State relaxed;independent   Infant Positioning clutch/"football"  (left breast)   Signs of Milk Transfer other (see comments)  (none)   Presence of Pain no   Breastfeeding Education milk expression, hand;adequate milk volume;adequate infant intake;importance of skin-to-skin contact   Feeding Infant   Feeding Readiness Cues rooting   Feeding Tolerance/Success tongue thrusting;suck inconsistent;refusal to suck   Effective Latch During Feeding no   Audible Swallow no   Lactation Interventions   Attachment Promotion breastfeeding assistance provided;family involvement promoted;infant-mother separation minimized;rooming-in promoted;skin-to-skin contact encouraged   Breast Care: Breastfeeding manual expression to soften breast   Breastfeeding Assistance assisted with positioning;both breasts offered each feeding;feeding cue recognition promoted;feeding on demand promoted;feeding session observed;infant latch-on verified;infant suck/swallow verified;support offered;supplemental feeding provided   Maternal Breastfeeding Support lactation counseling provided;encouragement offered   Latch Promotion positioning assisted;infant moved to breast;suck stimulated with colostrum drop     "

## 2017-02-16 NOTE — LACTATION NOTE
Lactation back to room to assist with feeding. Mother positions infant beautifully to the right breast in the football position, mother easily obtains deep asymmetrical latch independently and infant suckles a burst & then looses latch. This pattern persist for 15 minutes. Suck training exercises preformed and taught to mother, attempted to feed at the breast, and same pattern persist. Feeding attempt at the breast discontinued. Hand expression reviewed again with mother. Mother to hand express and supplement via syringe. Continue with plan previously laid out by lactation, add suck training exercises prior to feeding at the breast.     02/16/17 1445   LATCH Score   Latch 1-->repeated attempts, holds nipple in mouth, stimulate to suck   Audible Swallowing 0-->none   Type Of Nipple 2-->everted (after stimulation)   Comfort (Breast/Nipple) 2-->soft/nontender   Hold (Positioning) 2-->no assist from staff, mother able to position/hold infant   Score (less than 7 for 2/more consecutive times, consult Lactation Consultant) 7   Maternal Infant Feeding   Maternal Emotional State relaxed;independent   Infant Positioning cross-cradle  (right breast)   Signs of Milk Transfer other (see comments)  (none)   Presence of Pain no   Breastfeeding Education importance of skin-to-skin contact;adequate milk volume;adequate infant intake;milk expression, hand   Feeding Infant   Feeding Readiness Cues rooting   Feeding Tolerance/Success tongue thrusting;refusal to suck;suck inconsistent   Effective Latch During Feeding no   Audible Swallow no   Lactation Interventions   Attachment Promotion breastfeeding assistance provided;family involvement promoted;infant-mother separation minimized;rooming-in promoted;skin-to-skin contact encouraged   Breast Care: Breastfeeding manual expression to soften breast   Breastfeeding Assistance assisted with positioning;both breasts offered each feeding;feeding cue recognition promoted;feeding on demand  promoted;feeding session observed;infant latch-on verified;support offered   Maternal Breastfeeding Support lactation counseling provided;encouragement offered   Latch Promotion suck stimulated with colostrum drop

## 2017-02-16 NOTE — TRANSFER OF CARE
Anesthesia Transfer of Care Note    Patient: Mago Mcallister    Procedure(s) Performed: Procedure(s) (LRB):  DELIVERY- SECTION WITH TUBAL (N/A)    Patient location: Labor and Delivery    Anesthesia Type: spinal    Transport from OR: Transported from OR on room air with adequate spontaneous ventilation    Post pain: adequate analgesia    Post assessment: no apparent anesthetic complications    Level of consciousness: awake, alert and oriented    Nausea/Vomiting: no nausea/vomiting    Complications: none          Last vitals:   Visit Vitals    /61 (Patient Position: Sitting)    Pulse 97    Temp 36.5 °C (97.7 °F) (Oral)    Resp 20    LMP 2016    SpO2 96%    Breastfeeding No

## 2017-02-16 NOTE — PROGRESS NOTES
Progress Note  Obstetrics - Post Operative      POD # 1  S/p PLTCS/RTL    Patient Active Problem List    Diagnosis Date Noted    Oligohydramnios in third trimester 02/15/2017    Status post repeat low transverse  section 02/15/2017    Status post tubal ligation 02/15/2017    Poor dentition 2017    Encounter for lactation counseling 2017    Vomiting and diarrhea 2017    Former smoker 2016    History of gestational hypertension 2016    Previous  section complicating pregnancy 2016       SUBJECTIVE:     Mago Mcallister is a 31 y.o.  who is doing well. Pain well tolerated. Tolerating PO intake, and no nausea, vomiting or fever. Has not ambulated or voided yet.      Review of patient's allergies indicates:  No Known Allergies    OBJECTIVE:     Vitals:    17 0600   BP: 117/84   Pulse: 80   Resp: 20   Temp: 97.5 °F (36.4 °C)         Intake/Output Summary (Last 24 hours) at 17 0754  Last data filed at 17 0600   Gross per 24 hour   Intake              900 ml   Output             1425 ml   Net             -525 ml         Physical Exam:  General: Alert and oriented in no distress  Chest: Clear to auscultation  CV: Regular Rate and Rhythm  Breast exam: no masses tenderness or nodules, not engorged, no erythema  Abdomen: Soft, appropriately tender, no rebound, non-distented; bowel sounds normal  Wound/Incision: clean, dry, intact  Uterus: fundus firm, appropriately tender below umbilicus  : lochia, normal amount  Extremities: no cyanosis or edema, or clubbing, Homans sign is negative, no sign of DVT and DTRs 2+ no clonus    ASSESSMENT/PLAN:   Assessment:   S/p PLTCS/RTL POD # 1 - Doing well    Plan: continue current treatment, out of bed, ambulate, pain control.

## 2017-02-16 NOTE — L&D DELIVERY NOTE
Delivery Information for  Christopher Mcallister    Birth information:  YOB: 2019   Time of birth: 9:02 PM   Sex: female   Head Delivery Date/Time: 2/15/2017  9:01 PM   Delivery type: , Low Transverse   Gestational Age: 38w1d    Delivery Providers    Delivering clinician:  SEAMUS YOUNG   Other personnel:   Provider Role   THADDEUS VALE Registered Nurse   SERGEI ROGEL Nurse Anesthetist   LU NORMAN Surgical Tech   TONY NERI Registered Nurse   ROBERT LOVE Midwife   MARGARET WELLS Anesthesiologist                       Assessment    Living status:  Yes   Apgars    1 Minute:   5 Minute:   10 Minute 15 Minute 20 Minute   Skin Color: 1  1       Heart Rate: 2  2       Reflex Irritability: 2  2       Muscle Tone: 2  2       Respiratory Effort: 2  2       Total: 9  9                  Apgars Assigned By:  SUBHASH NERI          Assisted Delivery Details:    Forceps attempted?:  No   Vacuum extractor attempted?:  No             Shoulder Dystocia    Shoulder dystocia present?:  No                                             Presentation and Position    Presentation: Vertex   Position:                    Interventions/Resuscitation    Method:  Bulb Suctioning, Tactile Stimulation        Cord    Vessels:  3 vessels   Complications:  None   Delayed Cord Clamping?:  Yes   Cord Clamped Date/Time:  2/15/2017  9:03 PM   Cord Blood Disposition:  Lab   Gases Sent?:  No   Stem Cell Collection (by MD):  No        Placenta    Date and time:  2/15/2017  9:05 PM   Removal:  Manual removal   Appearance:  Intact   Placenta disposition:  Discarded            Labor Events:       labor:       Labor Onset Date/Time:         Dilation Complete Date/Time:         Start Pushing Date/Time:       Rupture Date/Time:              Rupture type:           Fluid Amount:        Fluid Color:        Fluid Odor:        Membrane Status (PeriCalm):        Rupture Date/Time (PeriCalm):        Fluid  Amount (PeriCalm):        Fluid Color (PeriCalm):         steroids:       Antibiotics given for GBS:       Induction:       Indications for induction:        Augmentation:       Indications for augmentation:       Labor complications: None     Additional complications: Oligohydramnios        Cervical ripening:                     Delivery:      Episiotomy: None     Indication for Episiotomy:       Perineal Lacerations: None Repaired:      Periurethral Laceration: none Repaired:     Labial Laceration: none Repaired:     Sulcus Laceration: none Repaired:     Vaginal Laceration: No Repaired:     Cervical Laceration: No Repaired:     Repair suture:       Repair # of packets:       Blood loss (ml): 400     Vaginal Sweep Performed: No     Surgicount Correct: Yes       Other providers:       Anesthesia    Method:  Spinal              Details (if applicable):  Trial of Labor No    Categorization: Repeat    Priority: Routine   Indications for : Repeat Section   Incision Type: Low Transverse     Additional  information:  Forceps:    Vacuum:    Breech:    Observed anomalies    Other (Comments):

## 2017-02-16 NOTE — OP NOTE
OPERATIVE NOTE    DATE:  02/15/2017    PRE-PROCEDURE COUNSELING:  Patient counseled on the risks, benefits, and alternatives to procedure.  Please see preoperative consents.   SCDs were applied and working prior to anesthesia induction.    PRE-OPERATIVE DIAGNOSIS:  IUP at 38w1d, Prior , Oligohydramnios, Desires permanent sterilization    POST-OPERATIVE DIAGNOSIS: Same    ANESTHESIA: Spinal Anesthesia    PROCEDURE:  Repeat Low Transverse  Section with Right Tubal Ligation ([place])    SURGEON: Artur Neville MD    ASSISTANT: Yuko Zimmerman CNM    FINDINGS:  Single live female fetus in cephalic presentation.  APGAR 9/9 Weight 7 lb 8 oz.  Normal uterus, right tube, and ovaries.  Absent left tube consistent with prior history of salpingectomy.    SPECIMEN:  Right tubal segment    EBL:  400 mL    COMPLICATIONS:  None    IMPLANTS:  None    PROCEDURE IN DETAIL:   The patient was seen in the Holding Room. The risks, benefits, complications, treatment options, and expected outcomes were discussed with the patient incl alt to BTL and risk prenancy and ectopic pregnancy.  The patient concurred with the proposed plan, giving informed consent.  The patient was taken to Operating Room, identified as Mago Mcallister  and the procedure verified as  Delivery. A Time Out was held and the above information confirmed.    Spinal anesthesia was administered, adequate level confirmed, and preoperative antibiotics administered.  The patient was draped and prepped in the usual sterile fashion.  A Pfannenstiel skin incision was made along the old incisional scar and carried down through the subcutaneous tissue to the fascia. Fascial incision was then made and extended transversely. The fascia was  from the underlying rectus muscles superiorly and inferiorly. The peritoneum was identified, tented up, and entered sharply.  This incision was then extended superiorly and inferiorly with good  visualization of the underlying bowel and bladder.   The utero-vesical peritoneal reflection was incised transversely and the bladder flap was bluntly dissected from the lower uterine segment.  A low transverse uterine incision was made in the midline and extended laterally.  There was clear amniotic fluid noted. The female infant was delivered from vertex presentation without difficulty and with Apgar scores of 9/9.  The umbilical cord was doubly clamped and cut.  Cord blood was obtained. The placenta was removed intact and appeared normal.  The uterine incision was then approximated in a running locked fashion with 0-Chromic.   Right fallopian tube was identified and grasped in the mid-isthmic portion.  The tube was doubly ligated and transected in the [place] fashion.  A 2-3 cm segment of tube was transected.  Left tube was absent consistent with prior history of salpingectomy.  Adequate hemostasis was noted from all pedicles.  Uterus was returned to the abdomen.  The abdomen and pelvis were irrigated and hemostasis noted.  The rectus muscles were then loosely approximated at the midline with 2-0 Chromic.  The fascia was then approximated in a running fashion with 0-Vicryl.  Wound was irrigated and hemostasis noted.  Subcutaneous fat layer was approximated with 2-0 Plain Gut in a running fashion.  The skin was approximated with 4-0 vicryl in a running subcuticular fashion and a silver abdominal dressing applied.  Instrument, sponge, and needle counts were correct prior the abdominal closure and at the conclusion of the case.     DISPOSITION: To recovery PP room           CONDITION: Stable

## 2017-02-17 PROCEDURE — 11000001 HC ACUTE MED/SURG PRIVATE ROOM

## 2017-02-17 PROCEDURE — 25000003 PHARM REV CODE 250: Performed by: OBSTETRICS & GYNECOLOGY

## 2017-02-17 RX ADMIN — IBUPROFEN 800 MG: 800 TABLET ORAL at 10:02

## 2017-02-17 RX ADMIN — DOCUSATE SODIUM 100 MG: 100 CAPSULE, LIQUID FILLED ORAL at 08:02

## 2017-02-17 RX ADMIN — IBUPROFEN 800 MG: 800 TABLET ORAL at 01:02

## 2017-02-17 RX ADMIN — OXYCODONE HYDROCHLORIDE AND ACETAMINOPHEN 1 TABLET: 10; 325 TABLET ORAL at 03:02

## 2017-02-17 RX ADMIN — VITAMIN A ACETATE, .BETA.-CAROTENE, ASCORBIC ACID, CHOLECALCIFEROL, .ALPHA.-TOCOPHEROL ACETATE, DL-, THIAMINE MONONITRATE, RIBOFLAVIN, NIACINAMIDE, PYRIDOXINE HYDROCHLORIDE, FOLIC ACID, CYANOCOBALAMIN, CALCIUM CARBONATE, FERROUS FUMARATE, ZINC OXIDE, AND CUPRIC OXIDE 1 EACH: 2000; 2000; 120; 400; 22; 1.84; 3; 20; 10; 1; 12; 200; 27; 25; 2 TABLET ORAL at 08:02

## 2017-02-17 RX ADMIN — OXYCODONE HYDROCHLORIDE AND ACETAMINOPHEN 1 TABLET: 10; 325 TABLET ORAL at 07:02

## 2017-02-17 RX ADMIN — IBUPROFEN 800 MG: 800 TABLET ORAL at 05:02

## 2017-02-17 RX ADMIN — OXYCODONE HYDROCHLORIDE AND ACETAMINOPHEN 1 TABLET: 10; 325 TABLET ORAL at 11:02

## 2017-02-17 RX ADMIN — FERROUS SULFATE TAB EC 325 MG (65 MG FE EQUIVALENT) 325 MG: 325 (65 FE) TABLET DELAYED RESPONSE at 08:02

## 2017-02-17 RX ADMIN — OXYCODONE HYDROCHLORIDE AND ACETAMINOPHEN 1 TABLET: 10; 325 TABLET ORAL at 02:02

## 2017-02-17 NOTE — PLAN OF CARE
Problem: Patient Care Overview  Goal: Plan of Care Review  Outcome: Ongoing (interventions implemented as appropriate)  Pt progressing well. No issues noted currently. Pain relieved with po Motrin and Percocet. Aquacel dressing with small amount of drainage and area marked. Pt able to latch baby more successfully now but still has breast pump in use; has pumped twice since initial setup. Fundus firm with light lochia. Ambulating and voiding without difficulty. Family at bedside. Vitals stable. Will continue to monitor.

## 2017-02-17 NOTE — LACTATION NOTE
Cynvec Symphony pump at bedside. Instructed on proper usage and to adjust suction according to comfort level. Verified appropriate flange fit- 27. Educated mother on frequency and duration of pumping in order to promote and maintain full milk supply. Hands on pumping technique reviewed. Encouraged hand expression after. Instructed mother on cleaning of breast pump parts. Reviewed proper milk handling, collection, storage, and transportation. Voices understanding.

## 2017-02-17 NOTE — LACTATION NOTE
"Mother calls lactation back into room as infant is giving feeding cues, mother with hand expression still in progress, infant has not yet received EBM at this feeding. Mother positions infant to the right breast in the football position, again, infant does not maintain latch. After a couple of minutes infant placed to the left breast in the cross cradle & football position, infant easily obtains deep asymmetrical latch, maintains latch for 30 seconds and then unlatches. This pattern persist for about 10 minutes, so swallows audible, infant falls asleep. Infant syringe fed 5 mL EBM and is asleep. Continue with current plan of care. Mother to call for assistance as needed.     02/17/17 1045   LATCH Score   Latch 1-->repeated attempts, holds nipple in mouth, stimulate to suck   Audible Swallowing 0-->none   Type Of Nipple 2-->everted (after stimulation)   Comfort (Breast/Nipple) 2-->soft/nontender   Hold (Positioning) 2-->no assist from staff, mother able to position/hold infant   Score (less than 7 for 2/more consecutive times, consult Lactation Consultant) 7   Maternal Infant Feeding   Maternal Emotional State independent;relaxed   Infant Positioning cross-cradle;clutch/"football"  (left breast)   Signs of Milk Transfer other (see comments)  (none)   Presence of Pain no   Comfort Measures Following Feeding air-drying encouraged;expressed milk applied   Nipple Shape After Feeding, Left WNL   Latch Assistance no   Additional Documentation Supplementation (Group)   Feeding Infant   Feeding Readiness Cues rooting   Satiety Cues sleeping after feeding;infant's body extended   Effective Latch During Feeding no   Audible Swallow no   Supplementation   Breastfeeding Supplementation Type expressed breast milk   Method of Supplementation syringe   Lactation Interventions   Attachment Promotion breastfeeding assistance provided;family involvement promoted;infant-mother separation minimized;skin-to-skin contact encouraged "   Breast Care: Breastfeeding milk massaged towards nipple   Breastfeeding Assistance feeding cue recognition promoted;feeding on demand promoted;feeding session observed;infant latch-on verified;support offered;supplemental feeding provided   Maternal Breastfeeding Support lactation counseling provided;encouragement offered

## 2017-02-17 NOTE — PROGRESS NOTES
Progress Note    Admit Date: 2/15/2017   LOS: 2 days     Patient Active Problem List   Diagnosis    Previous  section complicating pregnancy    Former smoker    History of gestational hypertension    Vomiting and diarrhea    Encounter for lactation counseling    Poor dentition    Oligohydramnios in third trimester    Status post repeat low transverse  section    Status post tubal ligation       SUBJECTIVE:     Patient has no complaints.  Ambulating, voiding, and tolerating po.  Having breastfeeding issues.    Scheduled Meds:   docusate sodium  100 mg Oral Daily    ferrous sulfate  325 mg Oral Daily with breakfast    ibuprofen  800 mg Oral Q8H    prenatal vitamin  1 tablet Oral Daily     Continuous Infusions:   ammonia       PRN Meds:acetaminophen, ammonia, diphenhydrAMINE, diphenhydrAMINE, hydrocortisone, lanolin, measles, mumps and rubella vaccine, ondansetron, oxycodone-acetaminophen, oxycodone-acetaminophen, pramoxine-hydrocortisone, promethazine (PHENERGAN) IVPB, senna-docusate 8.6-50 mg, simethicone, DIPH,PERTUS (ADACEL),TETANUS PF VAC (ADULT), zolpidem    Review of patient's allergies indicates:  No Known Allergies      OBJECTIVE:     Vital Signs (Most Recent)  Temp: 97.4 °F (36.3 °C) (17 0747)  Pulse: 73 (17 07)  Resp: 18 (17)  BP: 132/76 (17 0747)  SpO2: 97 % (17 0046)    Vital Signs Range (Last 24H):  Temp:  [97.4 °F (36.3 °C)-98 °F (36.7 °C)]   Pulse:  [66-79]   Resp:  [18-20]   BP: (124-144)/(67-94)     I & O (Last 24H):  Intake/Output Summary (Last 24 hours) at 17 0932  Last data filed at 17 2230   Gross per 24 hour   Intake                0 ml   Output              850 ml   Net             -850 ml       Physical Exam:  General - no acute distress.  Comfortable  Lungs - normal respiratory effort  Abdomen - soft, nontender and non-distended.  Incision healing well.  Extremities - nontender        ASSESSMENT/PLAN:     Patient  stable.    Plan: Continue routine postoperative care.  Anticipate discharge tomorrow.

## 2017-02-17 NOTE — PLAN OF CARE
Problem: Patient Care Overview  Goal: Plan of Care Review  Outcome: Ongoing (interventions implemented as appropriate)  Patient progressing fine. Pain controlled with medication. Bonding well with baby.

## 2017-02-17 NOTE — LACTATION NOTE
Lactation Rounds: Infant weight loss & output WNL. Mother reports infant improving at feeding at the breast, only able to latch to the right breast, mother unsure if infant swallowing. Infant has not feed in 5 hours, infant has been skin to skin with mother. Infant begins to root, mother positions infant to the right breast in the football position. Infant latches, suckles short burst of 2-3 sucks, pushes nipple out of mouth, consistent with feeding yesterday. Mother states that infant had been doing better than this, since infant has not fed in some time, suggested mother pump, hand express and syringe feed. Reviewed proper use of breast pump, cleaning of breast pump kit, storage & handling of breast milk, hands on pumping techniques & hand expression afterwards, syringe feeding techniques. Mother able to teach back education. Instructed mother to call for assistance and assessment when infant begins to show feeding cues.    Plan:   Feed based on cues, skin to skin every 3 hours if no feeding cues present.  Attempt to latch infant to the breast at each feeding  If infant latches and feeds well with many swallows, allow infant to feed until content  If infant is unable to maintain latch within 15 minutes of trying or infant becomes frustrated,  Pump, hand express and syringe feed infant all available EBM.    Mother agrees with plan of care, teach back method utilized. Mother to call for assistance as needed.        02/17/17 1015   Maternal Infant Assessment   Breast Shape Bilateral:;round   Breast Density Bilateral:;soft   Areola Bilateral:;elastic   Nipple(s) Bilateral:;everted   Infant Assessment   Weight Loss (%) 6.2   Sucking Reflex present   Rooting Reflex present   Swallow Reflex present   Number of Stools (24 hours) 5   Number of Voids (24 hours) 4   LATCH Score   Latch 0-->too sleepy or reluctant, no latch achieved   Audible Swallowing 0-->none   Type Of Nipple 2-->everted (after stimulation)   Comfort  "(Breast/Nipple) 2-->soft/nontender   Hold (Positioning) 2-->no assist from staff, mother able to position/hold infant   Score (less than 7 for 2/more consecutive times, consult Lactation Consultant) 6   Maternal Infant Feeding   Maternal Emotional State independent;relaxed   Infant Positioning clutch/"football"  (right breast)   Signs of Milk Transfer other (see comments)  (none)   Presence of Pain no   Comfort Measures Following Feeding air-drying encouraged   Latch Assistance no   Breastfeeding Education adequate milk volume;adequate infant intake;importance of skin-to-skin contact;label/storage of breast milk;milk expression, electric pump;milk expression, hand   Feeding Infant   Feeding Readiness Cues rooting   Feeding Tolerance/Success tongue thrusting;reluctant to latch;refusal to suck   Effective Latch During Feeding no   Audible Swallow no   Equipment Type/Education   Pump Type Symphony   Breast Pump Type double electric, hospital grade   Breast Pump Flange Type hard   Breast Pump Flange Size 27 mm  (bilaterally)   Breast Pumping Bilateral Breasts:;pumped until emptied   Pumping Frequency (times) (with each feeding attempt)   Lactation Interventions   Attachment Promotion breastfeeding assistance provided;family involvement promoted;infant-mother separation minimized;rooming-in promoted;skin-to-skin contact encouraged   Breastfeeding Assistance assisted with positioning;both breasts offered each feeding;electric breast pump used;feeding session observed;feeding on demand promoted;feeding cue recognition promoted;nipple shell utilized;support offered   Maternal Breastfeeding Support lactation counseling provided;encouragement offered     "

## 2017-02-17 NOTE — LACTATION NOTE
"Lactation called to room to assist with feeding. Upon entering room, mother has infant to the right breast in the football position, deep latch noted, short choppy jaw movements, swallows audible with stimulation although infrequent; mother reports pain 5/10 that does not improve, nipple with compression line from 4-8 o'clock upon unlatching. Infant remains with feeding cues, mother positions infant to the left breast in the football position, infant latches deeply & easily, short choppy suck, no audible swallows, infant frequently unlatches and cries. No signs of further transfer so feeding discontinued, mother pump, hand express and supplement. Continue with current plan of care, mother again able to teach back current plan.      02/17/17 1430   Infant Assessment   Sucking Reflex present   Rooting Reflex present   Swallow Reflex present   LATCH Score   Latch 2-->grasps breast, tongue down, lips flanged, rhythmic sucking   Audible Swallowing 1-->a few with stimulation   Type Of Nipple 2-->everted (after stimulation)   Comfort (Breast/Nipple) 2-->soft/nontender   Hold (Positioning) 2-->no assist from staff, mother able to position/hold infant   Score (less than 7 for 2/more consecutive times, consult Lactation Consultant) 9   Maternal Infant Feeding   Maternal Emotional State independent;relaxed   Infant Positioning clutch/"football"  (right and left breast)   Signs of Milk Transfer infant jaw motion present;audible swallow  (infrequent swallow)   Presence of Pain yes   Pain Location nipples, bilateral  (5/10, remains through feeding)   Pain Description other (see comments)  (biting)   Comfort Measures Before/During Feeding latch adjusted;maternal position adjusted;infant position adjusted   Comfort Measures Following Feeding expressed milk applied   Feeding Infant   Feeding Readiness Cues rooting   Audible Swallow yes  (infrequent)   Suck/Swallow Coordination present   Lactation Interventions   Attachment Promotion " breastfeeding assistance provided;infant-mother separation minimized;skin-to-skin contact encouraged;rooming-in promoted   Breast Care: Breastfeeding milk massaged towards nipple   Breastfeeding Assistance both breasts offered each feeding;feeding cue recognition promoted;feeding on demand promoted;feeding session observed;infant latch-on verified;infant suck/swallow verified;milk expression/pumping;support offered;supplemental feeding provided   Maternal Breastfeeding Support lactation counseling provided;encouragement offered

## 2017-02-18 VITALS
DIASTOLIC BLOOD PRESSURE: 72 MMHG | BODY MASS INDEX: 39.69 KG/M2 | RESPIRATION RATE: 18 BRPM | SYSTOLIC BLOOD PRESSURE: 134 MMHG | HEIGHT: 63 IN | OXYGEN SATURATION: 97 % | TEMPERATURE: 98 F | WEIGHT: 224 LBS | HEART RATE: 77 BPM

## 2017-02-18 PROBLEM — R11.10 VOMITING AND DIARRHEA: Status: RESOLVED | Noted: 2017-01-18 | Resolved: 2017-02-18

## 2017-02-18 PROBLEM — R19.7 VOMITING AND DIARRHEA: Status: RESOLVED | Noted: 2017-01-18 | Resolved: 2017-02-18

## 2017-02-18 PROCEDURE — 25000003 PHARM REV CODE 250: Performed by: OBSTETRICS & GYNECOLOGY

## 2017-02-18 RX ORDER — OXYCODONE AND ACETAMINOPHEN 5; 325 MG/1; MG/1
1 TABLET ORAL EVERY 4 HOURS PRN
Qty: 25 TABLET | Refills: 0 | Status: SHIPPED | OUTPATIENT
Start: 2017-02-18 | End: 2017-02-24 | Stop reason: SDUPTHER

## 2017-02-18 RX ADMIN — IBUPROFEN 800 MG: 800 TABLET ORAL at 05:02

## 2017-02-18 RX ADMIN — DOCUSATE SODIUM 100 MG: 100 CAPSULE, LIQUID FILLED ORAL at 09:02

## 2017-02-18 RX ADMIN — OXYCODONE HYDROCHLORIDE AND ACETAMINOPHEN 1 TABLET: 10; 325 TABLET ORAL at 12:02

## 2017-02-18 RX ADMIN — OXYCODONE HYDROCHLORIDE AND ACETAMINOPHEN 1 TABLET: 10; 325 TABLET ORAL at 08:02

## 2017-02-18 RX ADMIN — IBUPROFEN 800 MG: 800 TABLET ORAL at 01:02

## 2017-02-18 RX ADMIN — FERROUS SULFATE TAB EC 325 MG (65 MG FE EQUIVALENT) 325 MG: 325 (65 FE) TABLET DELAYED RESPONSE at 09:02

## 2017-02-18 RX ADMIN — OXYCODONE HYDROCHLORIDE AND ACETAMINOPHEN 1 TABLET: 10; 325 TABLET ORAL at 04:02

## 2017-02-18 RX ADMIN — VITAMIN A ACETATE, .BETA.-CAROTENE, ASCORBIC ACID, CHOLECALCIFEROL, .ALPHA.-TOCOPHEROL ACETATE, DL-, THIAMINE MONONITRATE, RIBOFLAVIN, NIACINAMIDE, PYRIDOXINE HYDROCHLORIDE, FOLIC ACID, CYANOCOBALAMIN, CALCIUM CARBONATE, FERROUS FUMARATE, ZINC OXIDE, AND CUPRIC OXIDE 1 EACH: 2000; 2000; 120; 400; 22; 1.84; 3; 20; 10; 1; 12; 200; 27; 25; 2 TABLET ORAL at 09:02

## 2017-02-18 NOTE — PLAN OF CARE
Problem: Breastfeeding (Adult,Obstetrics,Pediatric)  Goal: Signs and Symptoms of Listed Potential Problems Will be Absent, Minimized or Managed (Breastfeeding)  Signs and symptoms of listed potential problems will be absent, minimized or managed by discharge/transition of care (reference Breastfeeding (Adult,Obstetrics,Pediatric) [facility]).   Outcome: Ongoing (interventions implemented as appropriate)  Pt is putting baby to breast and using breast pump and feeding via syringe. Mother looks overwhelmed and tired. Pt was crying stating she was tired. Reassured mother and tried to comfort her. Offered my assistance with feedings and told her to call me if she needs. Pt verbalized understanding.

## 2017-02-18 NOTE — DISCHARGE INSTRUCTIONS
"Mother Self Care:    Activity: Avoid strenuous exercise and get adequate rest.  No driving until the physician consent given.  Emotional Changes: Most women find birth to be a time of great emotional upheaval.  Sense of loss, mood swings, fatigue, anxiety, and feeling "let down" are common.  If feelings worsen or last more than a week, call your physician.  Breast Care/Breastfeeding: Wear a bra for comfort.  Keep nipples dry and apply your own breast milk or lanolin cream as needed for soreness.  Engorgement can be relieved with warm, moist heat before feedings.  You may also take Ibuprofen.  Breast Care/Bottle Feeding: Wear support bra 24 hours a day for one week.  Avoid stimulation to breasts.  You may use ice packs for discomfort.  Riky-Care/Vaginal Bleeding: Remember to use your riky-bottle after urinating.  Your flow will change from red, to pink, to yellow/white color over a period of 2 weeks.  Menstruation will return in 3-8 weeks, or longer if breastfeeding.  Episiotomy Vaginal Delivery: Stitches will dissolve within 10 days to 3 weeks.  Warm baths, tucks, and dermoplast will promote healing.  Avoid bubble baths or strong soaps.   Section/Tubal Ligation: Keep incision clean and dry.  Please remove steri-strips in 5-7 days.  You may shower, but avoid baths.  Sexual Activity/Pelvic Rest: No sexual activity, tampons, or douching until your physician gives you consent.  Diet: Continue to eat from the five basic food groups, including plenty of protein, fruits, vegetables, and whole grains.  Limit empty calories and high fat foods.  Drink enough fluids to satisfy thirst and add an extra 500 calories for breastfeeding.  Constipation/Hemorrhoids: Drink plenty of water.  You may take a stool softener or natural laxative (Metamucil). You may use tucks or hemorrhoid ointment and soak in a warm tub.    CALL YOUR OB DOCTOR IF ANY OF THE FOLLOWING OCCURS:  *Heavy bleeding - saturating a pad an hour or passing any " large (2-3 inches in size) blood clots.  *Any pain, redness, or tenderness in lower leg.  *You cannot care for yourself or your baby.  *Any signs of infection-      - Temperature greater than 100.5 degrees F      - Foul smelling vaginal discharge and/or incisional drainage      - Increased episiotomy or incisional pain      - Hot, hard, red or sore area on breast      - Flu-like symptoms      - Any urgency, frequency or burning with urination

## 2017-02-18 NOTE — LACTATION NOTE
"Lactation Rounds: Infant wt loss 8.4%, 3 stools and no voids in last 24 hours. Mother states infant latched to breast well once overnight but has mostly been syringe feeding EBM. Mother states her milk is coming in, able to pump 10-15 mL. Discussed infant stomach size, encouraged to offer any collected EBM via syringe, as close to 15-30 mL as possible. Encouraged mother to offer bottles by day 5 if infant continues to primarily syringe feed as large amounts necessary cannot be safely syringe fed.  Infant exhibiting feeding cues. Mother independently positioned infant to right breast in football hold. After a few attempts, infant obtained deep asymmetric latch and settled into nutritive suckling pattern. Mother performed frequent breast massage and compression, infant fed with numerous audible swallows and gulps. After about 25 minutes, infant released breast, content. Nipple shape WNL upon unlatching. Mother chose to pump left breast. Encouraged mother to offer any collected EBM via syringe after feedings as desired until pediatrician follow up for weight check.  Mother sleepy, requesting return for discharge teaching.     02/18/17 0900   Infant Assessment   Weight Loss (%) 8.4   Number of Stools (24 hours) 3   Number of Voids (24 hours) 0   LATCH Score   Latch 2-->grasps breast, tongue down, lips flanged, rhythmic sucking   Audible Swallowing 2-->spontaneous and intermittent (24 hrs old)   Type Of Nipple 2-->everted (after stimulation)   Comfort (Breast/Nipple) 2-->soft/nontender   Hold (Positioning) 2-->no assist from staff, mother able to position/hold infant   Score (less than 7 for 2/more consecutive times, consult Lactation Consultant) 10   Maternal Infant Feeding   Infant Positioning clutch/"football"   Signs of Milk Transfer audible swallow;infant jaw motion present   Presence of Pain no   Comfort Measures Following Feeding expressed milk applied   Nipple Shape After Feeding, Right WNL   Latch Assistance no " "  Feeding Infant   Satiety Cues infant releases breast;sleeping after feeding   Effective Latch During Feeding yes   Audible Swallow yes   Lactation Interventions   Attachment Promotion breastfeeding assistance provided;counseling provided;rooming-in promoted;skin-to-skin contact encouraged;infant-mother separation minimized;privacy provided   Breastfeeding Assistance both breasts offered each feeding;assisted with positioning;feeding cue recognition promoted;feeding on demand promoted;feeding session observed;infant latch-on verified;infant suck/swallow verified;milk expression/pumping;support offered   Maternal Breastfeeding Support encouragement offered;lactation counseling provided        02/18/17 0900   Infant Assessment   Weight Loss (%) 8.4   Number of Stools (24 hours) 3   Number of Voids (24 hours) 0   LATCH Score   Latch 2-->grasps breast, tongue down, lips flanged, rhythmic sucking   Audible Swallowing 2-->spontaneous and intermittent (24 hrs old)   Type Of Nipple 2-->everted (after stimulation)   Comfort (Breast/Nipple) 2-->soft/nontender   Hold (Positioning) 2-->no assist from staff, mother able to position/hold infant   Score (less than 7 for 2/more consecutive times, consult Lactation Consultant) 10   Maternal Infant Feeding   Infant Positioning clutch/"football"   Signs of Milk Transfer audible swallow;infant jaw motion present   Presence of Pain no   Comfort Measures Following Feeding expressed milk applied   Nipple Shape After Feeding, Right WNL   Latch Assistance no   Feeding Infant   Satiety Cues infant releases breast;sleeping after feeding   Effective Latch During Feeding yes   Audible Swallow yes   Lactation Interventions   Attachment Promotion breastfeeding assistance provided;counseling provided;rooming-in promoted;skin-to-skin contact encouraged;infant-mother separation minimized;privacy provided   Breastfeeding Assistance both breasts offered each feeding;assisted with positioning;feeding " cue recognition promoted;feeding on demand promoted;feeding session observed;infant latch-on verified;infant suck/swallow verified;milk expression/pumping;support offered   Maternal Breastfeeding Support encouragement offered;lactation counseling provided

## 2017-02-18 NOTE — LACTATION NOTE
Lactation to room for discharge teaching, feeding assessment. Upon entering room, infant latched to right breast in football hold, numerous audible swallows and gulps noted. Mother states she attempted to latch to left breast but infant was unable to maintain latch. Encouraged mother to offer left breast to ensure equal stimulation. Infant released right breast, mother independently positioned infant to left breast in football hold. Infant sleepy, will attempt to latch but does not maintain latch. Assisted with positioning to cross cradle hold, infant now more alert. Infant briefly obtained deep asymmetric latch, mother performed breast compression, infant suckled but no audible swallows. Infant released breast, content, no feeding cues noted. Encouraged mother to pump left side to ensure adequate stimulation. Discussed in depth signs of effective milk transfer, warning signs and when to call pediatrician/lactation once discharged. Mother reports infant had wet diaper prior to encounter. Reviewed importance of adequate output to ensure adequate intake.   Lactation discharge information reviewed.  Mother is aware of warm line, and outpatient consultations and monthly support gatherings. Encouraged mother to contact lactation with any questions, concerns, or problems. Contact numbers provided, and mother verbalizes understanding.    Feeding plan: offer breast based on feeding cues, at least every 2-3 hours including overnight. Perform suck training exercises prior to attempting to latch. If infant unable to settle for long nurtritive feeding with signs of effective milk transfer, mother to pump to protect milk supply. Mother to offer any collected EBM via syringe, switching to bottles by day 5. Mother has Medela PISA for home use. Mother in agreement with plan, verified using teach back method.

## 2017-02-18 NOTE — DISCHARGE SUMMARY
[facility] -   Delivery Discharge Summary  Obstetrics    Admit Date: 2/15/2017    Discharge Date and Time:  2017 10:16 AM      Attending Physician: Artur Neville MD     Discharge Provider: AISHWARYA Daniel    Reason for Admission: Oligohydramnios, previous  section     Estimated Date of Delivery: 17     Conditions: Oligohydramnios and Prior     Procedures Performed: Procedure(s) (LRB):  DELIVERY- SECTION WITH TUBAL (N/A)    Hospital Course (synopsis of major diagnoses, care, treatment, and services provided during the course of the hospital stay):    Mago Mcallister is a 31 y.o. now ,who was admitted on 2/15/2017 for repeat  section. On initial assessment, vital signs were stable and physical exam was normal. Infant was in cephalic presentation. Patient was subsequently admitted to labor and delivery unit with signed consents. Patient delivered a single viable  female. Pt was in stable condition post-delivery and was transferred to the Mother-Baby Unit. Her postpartum course was not complicated. On discharge day, patient's pain is controlled with oral pain medications. Patient is tolerating PO without nausea or vomiting, ambulating, voiding. She denies SOB and CP. Denies any HA, vision changes, F/C, LE swelling. Denies any breast pain/soreness.     Delivery:    Episiotomy: None   Lacerations: None   Repair suture:     Repair # of packets:     Blood loss (ml): 0     Birth information:  YOB: 2019   Time of birth: 9:02 PM   Sex: female   Delivery type: , Low Transverse   Gestational Age: 38w1d    Delivery Clinician:      Other providers:       Additional  information:  Forceps:    Vacuum:    Breech:    Observed anomalies      Living?:           APGARS  One minute Five minutes Ten minutes   Skin color:         Heart rate:         Grimace:         Muscle tone:         Breathing:         Totals: 9  9         Placenta: Delivered:       appearance    Tubal Ligation: done with c/section    Feeding Method: breast    Rh Immune Globulin Given: not applicable    Rubella Vaccine Given: not applicable    Tdap Vaccine Given: yes    Consults: none    Significant Diagnostic Studies: none    Final Diagnoses:   Principal Problem: Status post repeat low transverse  section   Secondary Diagnoses:   Active Hospital Problems    Diagnosis  POA    *Status post repeat low transverse  section [Z98.891]  Not Applicable    Oligohydramnios in third trimester [O41.03X0]  Yes    Status post tubal ligation [Z98.51]  Not Applicable    Previous  section complicating pregnancy [O34.219]  Yes      Resolved Hospital Problems    Diagnosis Date Resolved POA    Oligohydramnios antepartum [O41.00X0] 02/15/2017 Yes       Discharged Condition: good    Disposition: Home or Self Care    Follow Up/Patient Instructions:     Medications:  Reconciled Home Medications:   Current Discharge Medication List      START taking these medications    Details   oxycodone-acetaminophen (PERCOCET) 5-325 mg per tablet Take 1 tablet by mouth every 4 (four) hours as needed for Pain.  Qty: 25 tablet, Refills: 0    Associated Diagnoses: Status post repeat low transverse  section         CONTINUE these medications which have NOT CHANGED    Details   prenatal multivit-Ca-min-Fe-FA (PRENATAL VITAMIN) Tab Take 1 tablet by mouth once daily.  Qty: 30 each, Refills: 11    Comments: Any PNV is OK         STOP taking these medications       ondansetron (ZOFRAN) 4 MG tablet Comments:   Reason for Stopping:               Discharge Procedure Orders  Diet general     Activity as tolerated     Shower on day dressing removed (No bath)   Order Comments: May start to bath one week after  section, shower ok day one after surgery     Lifting restrictions   Order Comments: Don not lift anything heavier than your baby for 6 weeks     Other restrictions  (specify):   Order Comments: Avoid vaginal intercourse for at least 4 weeks     Call MD for:  temperature >100.4     Call MD for:  persistent nausea and vomiting     Call MD for:  severe uncontrolled pain     Call MD for:  difficulty breathing, headache or visual disturbances     Call MD for:  redness, tenderness, or signs of infection (pain, swelling, redness, odor or green/yellow discharge around incision site)     Call MD for:  hives     Call MD for:  persistent dizziness or light-headedness     Call MD for:  extreme fatigue     Call MD for:   Order Comments: Heavy vaginal bleeding or clots larger than the size of an egg that is lasting longer than 60 minutes   Expect some bleeding and small clots for up to 6 weeks following delivery     No dressing needed   Order Comments: Remove steri strips 7 days after surgery unless they have already fallen off       Follow-up Information     Follow up with Artur Neville MD. Go on 3/22/2017.    Specialty:  Obstetrics and Gynecology    Why:  postpartum and post op follow up    Contact information:    [address]  [place] [address] [phone]          Follow up with Artur Neville MD In 1 week.    Specialty:  Obstetrics and Gynecology    Why:  For post operative follow up     Contact information:    [address]  [place] [address] [phone]

## 2017-02-18 NOTE — PLAN OF CARE
Problem: Patient Care Overview  Goal: Plan of Care Review  Outcome: Ongoing (interventions implemented as appropriate)  Pt stated she was tired a lot and in pain. Percocet and Ibuprofen being given for pain. Fundus is firm. Aquacel has moderate dried drainage that has not spread from its previous gloria. Bleeding is light. Continuing to monitor.

## 2017-02-18 NOTE — LACTATION NOTE
Lactation rounds  Mother has family at her bedside. Mother understand feeding plan, states that she swill continue offering the breast, pump, and offering EBM by syringe. Denies any concerns at the moment.      02/17/17 1915   Lactation Interventions   Attachment Promotion counseling provided;breastfeeding assistance provided;environment adjusted;face-to-face positioning promoted;family involvement promoted;privacy provided;rooming-in promoted;skin-to-skin contact encouraged;role responsibility promoted;infant-mother separation minimized   Breast Care: Breastfeeding manual expression to soften breast;milk massaged towards nipple   Breastfeeding Assistance support offered   Maternal Breastfeeding Support encouragement offered;infant-mother separation minimized;lactation counseling provided   Latch Promotion infant's mouth opened gently

## 2017-02-19 NOTE — PROGRESS NOTES
Discharge instructions given and reviewed with pt. Questions answered at this time. Pt verbalized understanding. Vss. Infant on lap in mother's arms for discharge taken to car in wheelchair by staff.

## 2017-02-22 ENCOUNTER — TELEPHONE (OUTPATIENT)
Dept: OBSTETRICS AND GYNECOLOGY | Facility: CLINIC | Age: 32
End: 2017-02-22

## 2017-02-22 ENCOUNTER — ANESTHESIA (OUTPATIENT)
Dept: OBSTETRICS AND GYNECOLOGY | Facility: HOSPITAL | Age: 32
End: 2017-02-22
Payer: MEDICAID

## 2017-02-22 ENCOUNTER — SURGERY (OUTPATIENT)
Age: 32
End: 2017-02-22

## 2017-02-22 NOTE — TELEPHONE ENCOUNTER
----- Message from Irena Jamil sent at 2/22/2017  2:26 PM CST -----  Patient has an appointment this afternoon for a dressing change, but she does not have a way there.  She wants to know if she can come in in the morning.  Call her at [phone].                                              owens

## 2017-02-22 NOTE — TELEPHONE ENCOUNTER
----- Message from Francie Thakkar sent at 2/22/2017  4:26 PM CST -----  Contact: pt  Pt is returning nurse's call. Pt can be reached at [phone] (inxl)

## 2017-02-23 ENCOUNTER — CLINICAL SUPPORT (OUTPATIENT)
Dept: OBSTETRICS AND GYNECOLOGY | Facility: CLINIC | Age: 32
End: 2017-02-23
Payer: MEDICAID

## 2017-02-23 NOTE — PROGRESS NOTES
Patient arrived 1week post c section. The area was prepped with adhesive removal swabs. The bandage was removed with no problem. Patient tolerated well. Incision healing well.

## 2017-02-24 ENCOUNTER — PATIENT MESSAGE (OUTPATIENT)
Dept: OBSTETRICS AND GYNECOLOGY | Facility: CLINIC | Age: 32
End: 2017-02-24

## 2017-02-24 DIAGNOSIS — Z98.891 STATUS POST REPEAT LOW TRANSVERSE CESAREAN SECTION: ICD-10-CM

## 2017-02-24 RX ORDER — OXYCODONE AND ACETAMINOPHEN 5; 325 MG/1; MG/1
1 TABLET ORAL EVERY 4 HOURS PRN
Qty: 20 TABLET | Refills: 0 | Status: SHIPPED | OUTPATIENT
Start: 2017-02-24 | End: 2017-03-07

## 2017-03-07 ENCOUNTER — TELEPHONE (OUTPATIENT)
Dept: OBSTETRICS AND GYNECOLOGY | Facility: CLINIC | Age: 32
End: 2017-03-07

## 2017-03-07 ENCOUNTER — POSTPARTUM VISIT (OUTPATIENT)
Dept: OBSTETRICS AND GYNECOLOGY | Facility: CLINIC | Age: 32
End: 2017-03-07
Payer: MEDICAID

## 2017-03-07 VITALS — DIASTOLIC BLOOD PRESSURE: 98 MMHG | SYSTOLIC BLOOD PRESSURE: 144 MMHG | HEIGHT: 63 IN

## 2017-03-07 DIAGNOSIS — Z98.891 STATUS POST REPEAT LOW TRANSVERSE CESAREAN SECTION: Primary | ICD-10-CM

## 2017-03-07 DIAGNOSIS — L03.90 WOUND CELLULITIS: ICD-10-CM

## 2017-03-07 PROCEDURE — 99024 POSTOP FOLLOW-UP VISIT: CPT | Mod: ,,, | Performed by: OBSTETRICS & GYNECOLOGY

## 2017-03-07 PROCEDURE — 99999 PR PBB SHADOW E&M-EST. PATIENT-LVL II: CPT | Mod: PBBFAC,,, | Performed by: OBSTETRICS & GYNECOLOGY

## 2017-03-07 PROCEDURE — 99212 OFFICE O/P EST SF 10 MIN: CPT | Mod: PBBFAC,PO | Performed by: OBSTETRICS & GYNECOLOGY

## 2017-03-07 RX ORDER — CLINDAMYCIN HYDROCHLORIDE 150 MG/1
150 CAPSULE ORAL EVERY 6 HOURS
Qty: 40 CAPSULE | Refills: 0 | Status: SHIPPED | OUTPATIENT
Start: 2017-03-07 | End: 2017-03-17

## 2017-03-07 NOTE — PROGRESS NOTES
"  Subjective:       Patient ID: Mago Mcallister is a 31 y.o. female.    Chief Complaint:  Post-op Problem (c/o incision is red, tender, "brownish, reddish" drainage x 2 days)      History of Present Illness  HPI  Pt is s/p RLTCS with Right Tubal Ligation on 2/15/17.  Pt complains of increased incisional pain, redness, and drainage of a small amount of brown sticky fluid.  Pt believes it may be due to infection.  Pt denies fevers and is breastfeeding.    GYN & OB History  Patient's last menstrual period was 2016.   Date of Last Pap: No result found    OB History    Para Term  AB SAB TAB Ectopic Multiple Living   5 3 3  2  0 2 0 3      # Outcome Date GA Lbr Mak/2nd Weight Sex Delivery Anes PTL Lv   5 Term 02/15/17 38w1d  3.402 kg (7 lb 8 oz) F CS-LTranv Spinal N Y      Complications: Oligohydramnios   4 Term 01/05/10 37w0d  2.75 kg (6 lb 1 oz) F CS-Unspec   Y   3 Term 09   2.835 kg (6 lb 4 oz) M CS-LTranv   Y   2 Ectopic            1 Ectopic                   Review of Systems  Review of Systems   Constitutional: Negative for activity change, appetite change, fatigue, fever and unexpected weight change.   Respiratory: Negative for shortness of breath.    Cardiovascular: Negative for chest pain, palpitations and leg swelling.   Gastrointestinal: Negative for abdominal pain, constipation, diarrhea, nausea and vomiting.   Genitourinary: Negative for dysuria, menstrual problem, pelvic pain, vaginal bleeding, vaginal discharge, vaginal pain and vaginal odor.   Musculoskeletal: Negative for back pain.   Neurological: Negative for syncope and headaches.   Psychiatric/Behavioral: Negative for depression. The patient is not nervous/anxious.    Breast: Negative for breast mass, breast pain, nipple discharge and skin changes          Objective:    Physical Exam:   Constitutional: She is oriented to person, place, and time. She appears well-developed and well-nourished. No distress.     "   Cardiovascular: Normal rate and regular rhythm.     Pulmonary/Chest: Effort normal.        Abdominal: Soft. Bowel sounds are normal. She exhibits abdominal incision. She exhibits no distension and no mass. There is no tenderness. There is no rebound and no guarding. No hernia.                 Musculoskeletal: Normal range of motion and moves all extremeties. She exhibits no edema or tenderness.       Neurological: She is alert and oriented to person, place, and time.    Skin: Skin is warm and dry.    Psychiatric: She has a normal mood and affect. Her behavior is normal. Thought content normal.          Assessment:        1. Status post repeat low transverse  section    2. Wound cellulitis             Plan:      Status post repeat low transverse  section    Wound cellulitis  -     clindamycin (CLEOCIN) 150 MG capsule; Take 1 capsule (150 mg total) by mouth every 6 (six) hours.  Dispense: 40 capsule; Refill: 0  -     Pt counseled on treatment options.  Pt prefers to proceed with antibiotic therapy.  Medication dosing, side-effects, and risks were reviewed with pt.  Pt advised to keep routine post-op visit as previously scheduled.

## 2017-03-07 NOTE — TELEPHONE ENCOUNTER
Patient states that she thinks that her incision is infection c/o bright red around the incision site, oozing dark red blood and very painful at 8 as pain level.  Patient wanted to schedule for evaluation.  Patient was scheduled for today at 1pm.  She voiced understanding of the time and location of the appointment and will call back if she can not make the appointment today.

## 2017-03-07 NOTE — TELEPHONE ENCOUNTER
----- Message from Julianna Sears sent at 3/7/2017  9:57 AM CST -----  Contact: Pt   Pt is requesting to speak with the nurse./ Thinks her incision is infected./ Pt can be reached at [phone] (nims)

## 2017-03-07 NOTE — MR AVS SNAPSHOT
[facility]  [address] [place] [address]  Phone: [phone]  Fax: [phone]                  Mago Mcallister   3/7/2017 4:45 PM   Postpartum Visit    Description:  Female : 1985   Provider:  Artur Neville MD   Department:  [facility]           Reason for Visit     Post-op Problem           Diagnoses this Visit        Comments    Status post repeat low transverse  section    -  Primary     Wound cellulitis                To Do List           Future Appointments        Provider Department Dept Phone    3/7/2017 4:45 PM Artur Neville MD [facility] OB/ -[phone]    3/22/2017 2:00 PM Artur Neville MD [facility] OB/ -[phone]      Goals (5 Years of Data)     None       These Medications        Disp Refills Start End    clindamycin (CLEOCIN) 150 MG capsule 40 capsule 0 3/7/2017 3/17/2017    Take 1 capsule (150 mg total) by mouth every 6 (six) hours. - Oral    Pharmacy: [facility] [address] [address] AT [facility] [address]  Ph #: [phone]         [facility]     [facility] Care [facility] -  Assistance  Registered nurses in the [facility] provide clinical advisement, health education, appointment booking, and other advisory services.  Call for this free service at [phone].             Medications           Message regarding Medications     Verify the changes and/or additions to your medication regime listed below are the same as discussed with your clinician today.  If any of these changes or additions are incorrect, please notify your healthcare provider.        START taking these NEW medications        Refills    clindamycin (CLEOCIN) 150 MG capsule 0    Sig: Take 1 capsule (150 mg total) by mouth every 6 (six) hours.    Class: Normal    Route: Oral      STOP taking these medications     oxycodone-acetaminophen (PERCOCET) 5-325 mg per tablet Take 1 tablet by mouth  "every 4 (four) hours as needed for Pain.           Verify that the below list of medications is an accurate representation of the medications you are currently taking.  If none reported, the list may be blank. If incorrect, please contact your healthcare provider. Carry this list with you in case of emergency.           Current Medications     prenatal multivit-Ca-min-Fe-FA (PRENATAL VITAMIN) Tab Take 1 tablet by mouth once daily.    clindamycin (CLEOCIN) 150 MG capsule Take 1 capsule (150 mg total) by mouth every 6 (six) hours.           Clinical Reference Information           Prenatal Vitals     Enc. Date GA Prenatal Vitals Prenatal Pulse Pain Level Urine Albumin/Glucose Edema Presentation Dilation/Effacement/Station    3/7/17 38w1d 144/98 (A)           2/15/17 38w1d [facility]    2/15/17 38w1d 132/84 / 102.3 kg (225 lb 8.5 oz)  / 116 / Present   Trace / Negative +2 / +2 / None / No      2/8/17 37w1d 128/60 / 99 kg (218 lb 4.1 oz) 37 cm / 130 / Present   Negative / Negative +2 / +2 / None / No      2/3/17 36w3d Admission Dept: [facility] L&D    2/1/17 36w1d 122/70 / 98 kg (216 lb 0.8 oz)  / 130 / Decreased (A)   Negative / Negative +2 / +2 / None / No  0 / 50 / -3    1/24/17 35w0d 136/86 / 98.5 kg (217 lb 2.5 oz)  / 146 / Present   Negative / Negative       1/18/17 34w1d Admission Dept: [facility] L&D    1/6/17 32w3d 124/80 / 95 kg (209 lb 7 oz)  / us 144 / Present   Negative / Negative None / None / None / No Vertex     10/20/16 21w2d 118/72 / 95 kg (209 lb 7 oz)  / us 144 / Present  0  None / None / None / No      9/29/16 18w2d 116/64 / 97.6 kg (215 lb 2.7 oz)           9/23/16 17w3d 122/60 / 97 kg (213 lb 13.5 oz)  / us / Absent   Negative / Negative None / None / None / No         TWG: -0.454 kg (-1 lb)   Pregravid weight: 102.1 kg (225 lb)   Number of babies: 1   Height: 5' 3" (1.6 m)   BMI: 39.9       Your Vitals Were     BP Height Last Period             144/98 5' 3" (1.6 m) 05/24/2016  "        Allergies as of 3/7/2017     No Known Allergies      Immunizations Administered on Date of Encounter - 3/7/2017     None      Language Assistance Services     ATTENTION: Language assistance services are available, free of charge. Please call [phone].      ATENCIÓN: Si ryder greco, tiene a koch disposición servicios gratuitos de asistencia lingüística. Llame al [phone].     CHÚ Ý: N?u b?n nói Ti?ng Vi?t, có các d?ch v? h? tr? ngôn ng? mi?n phí dành cho b?n. G?i s? [phone].         [facility] complies with applicable Federal civil rights laws and does not discriminate on the basis of race, color, national origin, age, disability, or sex.

## 2017-03-22 ENCOUNTER — PATIENT MESSAGE (OUTPATIENT)
Dept: OBSTETRICS AND GYNECOLOGY | Facility: CLINIC | Age: 32
End: 2017-03-22

## 2017-03-23 ENCOUNTER — POSTPARTUM VISIT (OUTPATIENT)
Dept: OBSTETRICS AND GYNECOLOGY | Facility: CLINIC | Age: 32
End: 2017-03-23
Payer: MEDICAID

## 2017-03-23 VITALS
DIASTOLIC BLOOD PRESSURE: 88 MMHG | SYSTOLIC BLOOD PRESSURE: 138 MMHG | WEIGHT: 191.81 LBS | HEIGHT: 63 IN | BODY MASS INDEX: 33.98 KG/M2

## 2017-03-23 DIAGNOSIS — Z98.891 STATUS POST REPEAT LOW TRANSVERSE CESAREAN SECTION: Primary | ICD-10-CM

## 2017-03-23 DIAGNOSIS — B37.89 YEAST INFECTION OF NIPPLE, POSTPARTUM: ICD-10-CM

## 2017-03-23 PROCEDURE — 0503F POSTPARTUM CARE VISIT: CPT | Mod: ,,, | Performed by: OBSTETRICS & GYNECOLOGY

## 2017-03-23 PROCEDURE — 99999 PR PBB SHADOW E&M-EST. PATIENT-LVL III: CPT | Mod: PBBFAC,,, | Performed by: OBSTETRICS & GYNECOLOGY

## 2017-03-23 PROCEDURE — 99213 OFFICE O/P EST LOW 20 MIN: CPT | Mod: PBBFAC | Performed by: OBSTETRICS & GYNECOLOGY

## 2017-03-23 RX ORDER — MUPIROCIN CALCIUM 20 MG/G
CREAM TOPICAL 2 TIMES DAILY
Qty: 30 G | Refills: 1 | Status: SHIPPED | OUTPATIENT
Start: 2017-03-23 | End: 2017-04-06

## 2017-03-23 RX ORDER — DOXYLAMINE SUCCINATE 25 MG
TABLET ORAL 2 TIMES DAILY
Refills: 0 | COMMUNITY
Start: 2017-03-23 | End: 2017-04-06

## 2017-03-23 NOTE — MR AVS SNAPSHOT
[facility]  [address]  [place] [address]  Phone: [phone]  Fax: [phone]                  Mago Mcallister   3/23/2017 11:45 AM   Postpartum Visit    Description:  Female : 1985   Provider:  Artur Neville MD   Department:  [facility]           Reason for Visit     Postpartum Care           Diagnoses this Visit        Comments    Status post repeat low transverse  section    -  Primary     Yeast infection of nipple, postpartum                To Do List           Goals (5 Years of Data)     None       These Medications        Disp Refills Start End    mupirocin calcium 2% (BACTROBAN) 2 % cream 30 g 1 3/23/2017 2017    Apply topically 2 (two) times daily. - Topical (Top)    [facility] [id number] [place] [address] [address] [facility] [address]  Ph #: [phone]         PURCHASE these Medications (No prescription required)        Start End    miconazole (MICOTIN) 2 % cream 3/23/2017 2017    Sig - Route: Apply topically 2 (two) times daily. - Topical (Top)    Class: OTC      [facility]     [facility] Care [facility] -  Assistance  Registered nurses in the [facility] Center provide clinical advisement, health education, appointment booking, and other advisory services.  Call for this free service at [phone].             Medications           Message regarding Medications     Verify the changes and/or additions to your medication regime listed below are the same as discussed with your clinician today.  If any of these changes or additions are incorrect, please notify your healthcare provider.        START taking these NEW medications        Refills    mupirocin calcium 2% (BACTROBAN) 2 % cream 1    Sig: Apply topically 2 (two) times daily.    Class: Normal    Route: Topical (Top)    miconazole (MICOTIN) 2 % cream 0    Sig: Apply topically 2 (two) times daily.    Class: OTC  "   Route: Topical (Top)           Verify that the below list of medications is an accurate representation of the medications you are currently taking.  If none reported, the list may be blank. If incorrect, please contact your healthcare provider. Carry this list with you in case of emergency.           Current Medications     prenatal multivit-Ca-min-Fe-FA (PRENATAL VITAMIN) Tab Take 1 tablet by mouth once daily.    miconazole (MICOTIN) 2 % cream Apply topically 2 (two) times daily.    mupirocin calcium 2% (BACTROBAN) 2 % cream Apply topically 2 (two) times daily.           Clinical Reference Information           Prenatal Vitals     Enc. Date GA Prenatal Vitals Prenatal Pulse Pain Level Urine Albumin/Glucose Edema Presentation Dilation/Effacement/Station    3/23/17 38w1d 138/88 / 87 kg (191 lb 12.8 oz)           3/7/17 38w1d 144/98 (A)           2/15/17 38w1d Admission Dx: Pregnancy Dept: [facility]    2/15/17 38w1d 132/84 / 102.3 kg (225 lb 8.5 oz)  / 116 / Present   Trace / Negative +2 / +2 / None / No      2/8/17 37w1d 128/60 / 99 kg (218 lb 4.1 oz) 37 cm / 130 / Present   Negative / Negative +2 / +2 / None / No      2/3/17 36w3d Admission Dept: [facility]    2/1/17 36w1d 122/70 / 98 kg (216 lb 0.8 oz)  / 130 / Decreased (A)   Negative / Negative +2 / +2 / None / No  0 / 50 / -3    1/24/17 35w0d 136/86 / 98.5 kg (217 lb 2.5 oz)  / 146 / Present   Negative / Negative       1/18/17 34w1d Admission Dept: [facility]    1/6/17 32w3d 124/80 / 95 kg (209 lb 7 oz)  / us 144 / Present   Negative / Negative None / None / None / No Vertex     10/20/16 21w2d 118/72 / 95 kg (209 lb 7 oz)  / us 144 / Present  0  None / None / None / No      9/29/16 18w2d 116/64 / 97.6 kg (215 lb 2.7 oz)           9/23/16 17w3d 122/60 / 97 kg (213 lb 13.5 oz)  / us / Absent   Negative / Negative None / None / None / No         TWG: -0.454 kg (-1 lb)   Pregravid weight: 102.1 kg (225 lb)   Number of babies: 1   Height: 5' 3" (1.6 m)   BMI: 39.9 " "      Your Vitals Were     BP Height Weight Last Period BMI    138/88 5' 3" (1.6 m) 87 kg (191 lb 12.8 oz) 05/24/2016 33.98 kg/m2      Allergies as of 3/23/2017     No Known Allergies      Immunizations Administered on Date of Encounter - 3/23/2017     None      Language Assistance Services     ATTENTION: Language assistance services are available, free of charge. Please call [phone].      ATENCIÓN: Si habla español, tiene a koch disposición servicios gratuitos de asistencia lingüística. Llame al [phone].     CHÚ Ý: N?u b?n nói Ti?ng Vi?t, có các d?ch v? h? tr? ngôn ng? mi?n phí dành cho b?n. G?i s? [phone].         [facility] complies with applicable Federal civil rights laws and does not discriminate on the basis of race, color, national origin, age, disability, or sex.        "

## 2017-03-23 NOTE — PROGRESS NOTES
"CC: Post-partum follow-up    Mago Mcallister is a 31 y.o. female  who presents for post-partum visit.  She is S/P a RLTCS/RTL.  She and the baby are doing well.  No fever.   No bowel/ bladder complaints.  Pt complains of highly tender nipples bilaterally that began shortly after infant was diagnosed with Thrush.  Pt also complains of small opening in right angle of incision.     Delivery Date: February 15, 2017  Delivery MD: Jamin  Gender: female  Birth Weight: 7 pounds 8 ounces  Breast Feeding: YES  Depression: NO  Contraception: bilateral tubal ligation    Pregnancy was complicated by:  Prior C/S    /88  Ht 5' 3" (1.6 m)  Wt 87 kg (191 lb 12.8 oz)  LMP 2016  BMI 33.98 kg/m2    ROS:  GENERAL: No fever, chills, fatigability.  CARDIOVASCULAR: No chest pain. No shortness of breath. No leg cramps.  BREAST:  No lumps. No discharge.  Nipples are highly tender.  ABDOMEN: No abdominal pain. Denies nausea. Denies vomiting. No diarrhea. No constipation  URINARY: No incontinence, no nocturia, no frequency and no dysuria.  VULVAR: No pain, no lesions and no itching.  VAGINAL: No relaxation, no itching, no discharge, no abnormal bleeding and no lesions.  NEUROLOGICAL: No headaches. No vision changes.    PHYSICAL EXAM:  GENERAL: Alert and oriented in no distress  CHEST: Clear to auscultation  BREAST: nipples are highly irritated and swollen bilaterally with small fissures, consistent with candidiasis  CV; Regular rate and rhythm  ABDOMEN:  Soft, non-tender, non-distended  WOUND: Healed well; small < 1 cm opening at right angle; no erythema, induration or pus  VULVA:  Normal, no lesions  CERVIX:  Without lesions, polyps or tenderness.  UTERUS:  Normal size, shape, consistency, no mass or tenderness.  ADNEXA:  Normal in size without mass or tenderness  EXTREMITIES: Non-tender, Negative Alyssa's    IMP:  Doing well S/P RLTCS/RTL  Instructions / precautions reviewed  Nipple candidiasis - Bactroban and " Miconazole BID for 10-14 days; continue breast feeding.      PLAN:  May resume normal activities  Return: annual exam

## 2017-03-24 ENCOUNTER — PATIENT MESSAGE (OUTPATIENT)
Dept: OBSTETRICS AND GYNECOLOGY | Facility: CLINIC | Age: 32
End: 2017-03-24

## 2017-05-22 PROBLEM — O41.03X0 OLIGOHYDRAMNIOS IN THIRD TRIMESTER: Status: RESOLVED | Noted: 2017-02-15 | Resolved: 2017-05-22
